# Patient Record
Sex: FEMALE | Race: WHITE | NOT HISPANIC OR LATINO
[De-identification: names, ages, dates, MRNs, and addresses within clinical notes are randomized per-mention and may not be internally consistent; named-entity substitution may affect disease eponyms.]

---

## 2017-09-08 PROBLEM — Z00.00 ENCOUNTER FOR PREVENTIVE HEALTH EXAMINATION: Status: ACTIVE | Noted: 2017-09-08

## 2017-09-13 ENCOUNTER — OTHER (OUTPATIENT)
Age: 57
End: 2017-09-13

## 2017-09-22 ENCOUNTER — APPOINTMENT (OUTPATIENT)
Dept: ORTHOPEDIC SURGERY | Facility: CLINIC | Age: 57
End: 2017-09-22
Payer: OTHER MISCELLANEOUS

## 2017-09-22 VITALS — HEIGHT: 64 IN | BODY MASS INDEX: 36.37 KG/M2 | WEIGHT: 213 LBS

## 2017-09-22 PROCEDURE — 73564 X-RAY EXAM KNEE 4 OR MORE: CPT | Mod: 50

## 2017-09-22 PROCEDURE — 99204 OFFICE O/P NEW MOD 45 MIN: CPT

## 2017-10-13 ENCOUNTER — TRANSCRIPTION ENCOUNTER (OUTPATIENT)
Age: 57
End: 2017-10-13

## 2017-12-07 ENCOUNTER — RX RENEWAL (OUTPATIENT)
Age: 57
End: 2017-12-07

## 2017-12-11 DIAGNOSIS — Z87.39 PERSONAL HISTORY OF OTHER DISEASES OF THE MUSCULOSKELETAL SYSTEM AND CONNECTIVE TISSUE: ICD-10-CM

## 2017-12-11 DIAGNOSIS — Z56.0 UNEMPLOYMENT, UNSPECIFIED: ICD-10-CM

## 2017-12-11 DIAGNOSIS — Z78.9 OTHER SPECIFIED HEALTH STATUS: ICD-10-CM

## 2017-12-11 DIAGNOSIS — Z80.9 FAMILY HISTORY OF MALIGNANT NEOPLASM, UNSPECIFIED: ICD-10-CM

## 2017-12-11 RX ORDER — IBUPROFEN 200 MG/1
CAPSULE, LIQUID FILLED ORAL
Refills: 0 | Status: ACTIVE | COMMUNITY

## 2017-12-11 SDOH — ECONOMIC STABILITY - INCOME SECURITY: UNEMPLOYMENT, UNSPECIFIED: Z56.0

## 2017-12-15 ENCOUNTER — APPOINTMENT (OUTPATIENT)
Dept: ORTHOPEDIC SURGERY | Facility: CLINIC | Age: 57
End: 2017-12-15
Payer: OTHER MISCELLANEOUS

## 2017-12-15 DIAGNOSIS — M21.161 VARUS DEFORMITY, NOT ELSEWHERE CLASSIFIED, RIGHT KNEE: ICD-10-CM

## 2017-12-15 DIAGNOSIS — M25.561 PAIN IN RIGHT KNEE: ICD-10-CM

## 2017-12-15 DIAGNOSIS — M25.562 PAIN IN RIGHT KNEE: ICD-10-CM

## 2017-12-15 DIAGNOSIS — M21.162 VARUS DEFORMITY, NOT ELSEWHERE CLASSIFIED, LEFT KNEE: ICD-10-CM

## 2017-12-15 PROCEDURE — 99214 OFFICE O/P EST MOD 30 MIN: CPT

## 2017-12-15 PROCEDURE — 73564 X-RAY EXAM KNEE 4 OR MORE: CPT | Mod: 50

## 2017-12-20 ENCOUNTER — OUTPATIENT (OUTPATIENT)
Dept: OUTPATIENT SERVICES | Facility: HOSPITAL | Age: 57
LOS: 1 days | Discharge: HOME | End: 2017-12-20
Payer: OTHER MISCELLANEOUS

## 2017-12-20 VITALS
SYSTOLIC BLOOD PRESSURE: 121 MMHG | WEIGHT: 215.83 LBS | DIASTOLIC BLOOD PRESSURE: 64 MMHG | HEART RATE: 65 BPM | RESPIRATION RATE: 16 BRPM | TEMPERATURE: 97 F | OXYGEN SATURATION: 99 % | HEIGHT: 64.5 IN

## 2017-12-20 DIAGNOSIS — M21.162 VARUS DEFORMITY, NOT ELSEWHERE CLASSIFIED, LEFT KNEE: ICD-10-CM

## 2017-12-20 DIAGNOSIS — Z01.818 ENCOUNTER FOR OTHER PREPROCEDURAL EXAMINATION: ICD-10-CM

## 2017-12-20 DIAGNOSIS — M17.0 BILATERAL PRIMARY OSTEOARTHRITIS OF KNEE: ICD-10-CM

## 2017-12-20 DIAGNOSIS — Z98.890 OTHER SPECIFIED POSTPROCEDURAL STATES: Chronic | ICD-10-CM

## 2017-12-20 DIAGNOSIS — M21.161 VARUS DEFORMITY, NOT ELSEWHERE CLASSIFIED, RIGHT KNEE: ICD-10-CM

## 2017-12-20 LAB
ANION GAP SERPL CALC-SCNC: 6 MMOL/L — SIGNIFICANT CHANGE UP (ref 5–17)
APTT BLD: 29.9 SEC — SIGNIFICANT CHANGE UP (ref 27.5–37.4)
BASOPHILS # BLD AUTO: 0 K/UL — SIGNIFICANT CHANGE UP (ref 0–0.2)
BASOPHILS NFR BLD AUTO: 0.7 % — SIGNIFICANT CHANGE UP (ref 0–2)
BUN SERPL-MCNC: 11 MG/DL — SIGNIFICANT CHANGE UP (ref 7–23)
CALCIUM SERPL-MCNC: 8.7 MG/DL — SIGNIFICANT CHANGE UP (ref 8.5–10.1)
CHLORIDE SERPL-SCNC: 106 MMOL/L — SIGNIFICANT CHANGE UP (ref 96–108)
CO2 SERPL-SCNC: 27 MMOL/L — SIGNIFICANT CHANGE UP (ref 22–31)
CREAT SERPL-MCNC: 0.72 MG/DL — SIGNIFICANT CHANGE UP (ref 0.5–1.3)
EOSINOPHIL # BLD AUTO: 0.1 K/UL — SIGNIFICANT CHANGE UP (ref 0–0.5)
EOSINOPHIL NFR BLD AUTO: 1.7 % — SIGNIFICANT CHANGE UP (ref 0–6)
GLUCOSE SERPL-MCNC: 96 MG/DL — SIGNIFICANT CHANGE UP (ref 70–99)
HBA1C BLD-MCNC: 5.7 % — HIGH (ref 4–5.6)
HCT VFR BLD CALC: 37 % — SIGNIFICANT CHANGE UP (ref 34.5–45)
HGB BLD-MCNC: 12.5 G/DL — SIGNIFICANT CHANGE UP (ref 11.5–15.5)
INR BLD: 1.07 RATIO — SIGNIFICANT CHANGE UP (ref 0.88–1.16)
LYMPHOCYTES # BLD AUTO: 1.5 K/UL — SIGNIFICANT CHANGE UP (ref 1–3.3)
LYMPHOCYTES # BLD AUTO: 24.2 % — SIGNIFICANT CHANGE UP (ref 13–44)
MCHC RBC-ENTMCNC: 28.7 PG — SIGNIFICANT CHANGE UP (ref 27–34)
MCHC RBC-ENTMCNC: 33.9 GM/DL — SIGNIFICANT CHANGE UP (ref 32–36)
MCV RBC AUTO: 84.8 FL — SIGNIFICANT CHANGE UP (ref 80–100)
MONOCYTES # BLD AUTO: 0.4 K/UL — SIGNIFICANT CHANGE UP (ref 0–0.9)
MONOCYTES NFR BLD AUTO: 6.3 % — SIGNIFICANT CHANGE UP (ref 2–14)
MRSA PCR RESULT.: SIGNIFICANT CHANGE UP
NEUTROPHILS # BLD AUTO: 4.2 K/UL — SIGNIFICANT CHANGE UP (ref 1.8–7.4)
NEUTROPHILS NFR BLD AUTO: 67.2 % — SIGNIFICANT CHANGE UP (ref 43–77)
PLATELET # BLD AUTO: 289 K/UL — SIGNIFICANT CHANGE UP (ref 150–400)
POTASSIUM SERPL-MCNC: 4.3 MMOL/L — SIGNIFICANT CHANGE UP (ref 3.5–5.3)
POTASSIUM SERPL-SCNC: 4.3 MMOL/L — SIGNIFICANT CHANGE UP (ref 3.5–5.3)
PROTHROM AB SERPL-ACNC: 11.7 SEC — SIGNIFICANT CHANGE UP (ref 9.8–12.7)
RBC # BLD: 4.36 M/UL — SIGNIFICANT CHANGE UP (ref 3.8–5.2)
RBC # FLD: 13.2 % — SIGNIFICANT CHANGE UP (ref 11–15)
S AUREUS DNA NOSE QL NAA+PROBE: SIGNIFICANT CHANGE UP
SODIUM SERPL-SCNC: 139 MMOL/L — SIGNIFICANT CHANGE UP (ref 135–145)
WBC # BLD: 6.2 K/UL — SIGNIFICANT CHANGE UP (ref 3.8–10.5)
WBC # FLD AUTO: 6.2 K/UL — SIGNIFICANT CHANGE UP (ref 3.8–10.5)

## 2017-12-20 PROCEDURE — 93010 ELECTROCARDIOGRAM REPORT: CPT | Mod: NC

## 2017-12-20 RX ORDER — FERROUS SULFATE 325(65) MG
1 TABLET ORAL
Qty: 0 | Refills: 0 | COMMUNITY

## 2017-12-20 NOTE — OCCUPATIONAL THERAPY INITIAL EVALUATION ADULT - ADDITIONAL COMMENTS
Patient lives in an apartment inside of an apartment complex which has a side entrance with no steps to enter. Once inside, the patient has an elevator that takes the patient to the main level of the apartment floor. The patients bathroom has a  high tub/shower combination with a regular toilet. The patient reports having a shower chair and no other devices in the bathroom. The patient ambulates with no device and owns multiple rolling walkers. The patient reports having 8/10 pain in both knees.

## 2017-12-20 NOTE — PHYSICAL THERAPY INITIAL EVALUATION ADULT - PERTINENT HX OF CURRENT PROBLEM, REHAB EVAL
Patient is for elective bilateral total knee arthroplasty at a later date from now due to chronic pain to knees.

## 2017-12-20 NOTE — OCCUPATIONAL THERAPY INITIAL EVALUATION ADULT - TRANSFER SAFETY CONCERNS NOTED: SIT/STAND, REHAB EVAL
decreased step length/decreased weight-shifting ability/decreased balance during turns/decreased sequencing ability

## 2017-12-20 NOTE — PHYSICAL THERAPY INITIAL EVALUATION ADULT - ADDITIONAL COMMENTS
As per patient, lives in apartment complex c elderly mother who is independent in all her functions. Patient reports did not require any mobility devices to ambulate but owns a shower chair. Has a side entrance to building which has a ramp access; front entrance with stairs. Has elevator access to apartment. Bedroom and bathroom all at same level. Bathroom has a shower and tub combo without grab rails.

## 2017-12-20 NOTE — OCCUPATIONAL THERAPY INITIAL EVALUATION ADULT - SOCIAL CONCERNS
As per patient "My brother can come by and help me with any of my daily tasks and needs when I get home".

## 2017-12-20 NOTE — PHYSICAL THERAPY INITIAL EVALUATION ADULT - PLANNED THERAPY INTERVENTIONS, PT EVAL
bed mobility training/balance training/stretching/ROM/gait training/strengthening/manual therapy techniques/transfer training/joint mobilization/neuromuscular re-education/postural re-education

## 2017-12-20 NOTE — H&P PST ADULT - HISTORY OF PRESENT ILLNESS
57F [facility] ___ here for PST for scheduled ____ 57F here for presurgical testing for scheduled bilateral total knee replacement

## 2017-12-20 NOTE — PHYSICAL THERAPY INITIAL EVALUATION ADULT - MODIFIED CLINICAL TEST OF SENSORY INTEGRATION IN BALANCE TEST
5x Sit to Stand Test = 11 seconds, indicating significant impairment c functional mobility & strength  ; 2 Minute Walk Test = 400 feet without devices or rest stops, Pain rating 3/10, measured for baseline recording

## 2017-12-20 NOTE — PATIENT PROFILE ADULT. - PSH
S/P bunionectomy  Right ( 2016 )  S/P foot surgery, left  2016  S/P laparoscopic surgery  LEFT KNEE 2005  S/P myomectomy  2013

## 2017-12-20 NOTE — PHYSICAL THERAPY INITIAL EVALUATION ADULT - CRITERIA FOR SKILLED THERAPEUTIC INTERVENTIONS
:,:,:CH/anticipated discharge recommendation/impairments found/rehab potential/anticipated equipment needs at discharge/risk reduction/prevention/therapy frequency

## 2017-12-20 NOTE — OCCUPATIONAL THERAPY INITIAL EVALUATION ADULT - MODIFIED CLINICAL TEST OF SENSORY INTEGRATION IN BALANCE TEST
Barthel Index: Feeding Score______, Bathing Score______, Grooming Score_____, Dressing Score_____, Bowel Score_____, Bladder Score______, Toilet Score_____, Transfer Score______, Mobility Score_____, Stairs Score_____, Total Score_____. Barthel Index: Feeding Score____10__, Bathing Score___5___, Grooming Score__5___, Dressing Score__10___, Bowel Score__10___, Bladder Score___10___, Toilet Score__10___, Transfer Score___15___, Mobility Score__15___, Stairs Score__10___, Total Score__100___.

## 2017-12-20 NOTE — H&P PST ADULT - NSANTHOSAYNRD_GEN_A_CORE
No. YINKA screening performed.  STOP BANG Legend: 0-2 = LOW Risk; 3-4 = INTERMEDIATE Risk; 5-8 = HIGH Risk

## 2017-12-20 NOTE — PHYSICAL THERAPY INITIAL EVALUATION ADULT - IMPAIRMENTS FOUND, PT EVAL
joint integrity and mobility/ROM/tone/integumentary integrity/aerobic capacity/endurance/gait, locomotion, and balance/muscle strength

## 2018-01-03 ENCOUNTER — FORM ENCOUNTER (OUTPATIENT)
Age: 58
End: 2018-01-03

## 2018-01-03 ENCOUNTER — INPATIENT (INPATIENT)
Facility: HOSPITAL | Age: 58
LOS: 2 days | Discharge: NURSING, REHAB OR LONG-TERM CARE | End: 2018-01-06
Attending: ORTHOPAEDIC SURGERY | Admitting: ORTHOPAEDIC SURGERY
Payer: OTHER MISCELLANEOUS

## 2018-01-03 VITALS
HEART RATE: 85 BPM | TEMPERATURE: 98 F | SYSTOLIC BLOOD PRESSURE: 159 MMHG | DIASTOLIC BLOOD PRESSURE: 84 MMHG | OXYGEN SATURATION: 95 % | RESPIRATION RATE: 18 BRPM

## 2018-01-03 DIAGNOSIS — Z98.890 OTHER SPECIFIED POSTPROCEDURAL STATES: Chronic | ICD-10-CM

## 2018-01-03 RX ORDER — ONDANSETRON 8 MG/1
8 TABLET, FILM COATED ORAL EVERY 6 HOURS
Qty: 0 | Refills: 0 | Status: DISCONTINUED | OUTPATIENT
Start: 2018-01-04 | End: 2018-01-06

## 2018-01-03 RX ORDER — HYDROMORPHONE HYDROCHLORIDE 2 MG/ML
1 INJECTION INTRAMUSCULAR; INTRAVENOUS; SUBCUTANEOUS EVERY 4 HOURS
Qty: 0 | Refills: 0 | Status: DISCONTINUED | OUTPATIENT
Start: 2018-01-04 | End: 2018-01-06

## 2018-01-03 RX ORDER — SENNA PLUS 8.6 MG/1
2 TABLET ORAL AT BEDTIME
Qty: 0 | Refills: 0 | Status: DISCONTINUED | OUTPATIENT
Start: 2018-01-04 | End: 2018-01-06

## 2018-01-03 RX ORDER — ACETAMINOPHEN 500 MG
650 TABLET ORAL EVERY 6 HOURS
Qty: 0 | Refills: 0 | Status: DISCONTINUED | OUTPATIENT
Start: 2018-01-04 | End: 2018-01-06

## 2018-01-03 RX ORDER — ACETAMINOPHEN 500 MG
650 TABLET ORAL ONCE
Qty: 0 | Refills: 0 | Status: COMPLETED | OUTPATIENT
Start: 2018-01-04 | End: 2018-01-04

## 2018-01-03 RX ORDER — OXYCODONE HYDROCHLORIDE 5 MG/1
20 TABLET ORAL ONCE
Qty: 0 | Refills: 0 | Status: DISCONTINUED | OUTPATIENT
Start: 2018-01-04 | End: 2018-01-04

## 2018-01-03 RX ORDER — MAGNESIUM HYDROXIDE 400 MG/1
30 TABLET, CHEWABLE ORAL DAILY
Qty: 0 | Refills: 0 | Status: DISCONTINUED | OUTPATIENT
Start: 2018-01-04 | End: 2018-01-06

## 2018-01-03 RX ORDER — OXYCODONE HYDROCHLORIDE 5 MG/1
10 TABLET ORAL EVERY 4 HOURS
Qty: 0 | Refills: 0 | Status: DISCONTINUED | OUTPATIENT
Start: 2018-01-04 | End: 2018-01-06

## 2018-01-03 RX ORDER — BENZOCAINE AND MENTHOL 5; 1 G/100ML; G/100ML
1 LIQUID ORAL EVERY 6 HOURS
Qty: 0 | Refills: 0 | Status: DISCONTINUED | OUTPATIENT
Start: 2018-01-04 | End: 2018-01-06

## 2018-01-03 RX ORDER — POLYETHYLENE GLYCOL 3350 17 G/17G
17 POWDER, FOR SOLUTION ORAL DAILY
Qty: 0 | Refills: 0 | Status: DISCONTINUED | OUTPATIENT
Start: 2018-01-04 | End: 2018-01-06

## 2018-01-03 RX ORDER — METOCLOPRAMIDE HCL 10 MG
10 TABLET ORAL EVERY 6 HOURS
Qty: 0 | Refills: 0 | Status: DISCONTINUED | OUTPATIENT
Start: 2018-01-04 | End: 2018-01-06

## 2018-01-03 RX ORDER — CELECOXIB 200 MG/1
200 CAPSULE ORAL ONCE
Qty: 0 | Refills: 0 | Status: COMPLETED | OUTPATIENT
Start: 2018-01-04 | End: 2018-01-04

## 2018-01-03 RX ORDER — DOCUSATE SODIUM 100 MG
100 CAPSULE ORAL THREE TIMES A DAY
Qty: 0 | Refills: 0 | Status: DISCONTINUED | OUTPATIENT
Start: 2018-01-04 | End: 2018-01-06

## 2018-01-03 RX ORDER — OXYCODONE HYDROCHLORIDE 5 MG/1
5 TABLET ORAL EVERY 4 HOURS
Qty: 0 | Refills: 0 | Status: DISCONTINUED | OUTPATIENT
Start: 2018-01-04 | End: 2018-01-06

## 2018-01-03 RX ORDER — SODIUM CHLORIDE 9 MG/ML
1000 INJECTION, SOLUTION INTRAVENOUS
Qty: 0 | Refills: 0 | Status: DISCONTINUED | OUTPATIENT
Start: 2018-01-04 | End: 2018-01-05

## 2018-01-03 RX ORDER — ENOXAPARIN SODIUM 100 MG/ML
40 INJECTION SUBCUTANEOUS EVERY 24 HOURS
Qty: 0 | Refills: 0 | Status: DISCONTINUED | OUTPATIENT
Start: 2018-01-05 | End: 2018-01-06

## 2018-01-03 NOTE — PATIENT PROFILE ADULT. - TEACHING/LEARNING LEARNING PREFERENCES
individual instruction/video/verbal instruction/pictorial/written material/skill demonstration/computer/internet/group instruction/audio

## 2018-01-04 ENCOUNTER — TRANSCRIPTION ENCOUNTER (OUTPATIENT)
Age: 58
End: 2018-01-04

## 2018-01-04 ENCOUNTER — APPOINTMENT (OUTPATIENT)
Dept: ORTHOPEDIC SURGERY | Facility: HOSPITAL | Age: 58
End: 2018-01-04

## 2018-01-04 ENCOUNTER — RESULT REVIEW (OUTPATIENT)
Age: 58
End: 2018-01-04

## 2018-01-04 LAB
ANION GAP SERPL CALC-SCNC: 12 MMOL/L — SIGNIFICANT CHANGE UP (ref 5–17)
BUN SERPL-MCNC: 7 MG/DL — SIGNIFICANT CHANGE UP (ref 7–23)
CALCIUM SERPL-MCNC: 8.2 MG/DL — LOW (ref 8.5–10.1)
CHLORIDE SERPL-SCNC: 109 MMOL/L — HIGH (ref 96–108)
CO2 SERPL-SCNC: 23 MMOL/L — SIGNIFICANT CHANGE UP (ref 22–31)
CREAT SERPL-MCNC: 0.8 MG/DL — SIGNIFICANT CHANGE UP (ref 0.5–1.3)
GLUCOSE SERPL-MCNC: 129 MG/DL — HIGH (ref 70–99)
HCT VFR BLD CALC: 35.1 % — SIGNIFICANT CHANGE UP (ref 34.5–45)
HGB BLD-MCNC: 11.8 G/DL — SIGNIFICANT CHANGE UP (ref 11.5–15.5)
INR BLD: 1.11 RATIO — SIGNIFICANT CHANGE UP (ref 0.88–1.16)
MCHC RBC-ENTMCNC: 28.8 PG — SIGNIFICANT CHANGE UP (ref 27–34)
MCHC RBC-ENTMCNC: 33.6 GM/DL — SIGNIFICANT CHANGE UP (ref 32–36)
MCV RBC AUTO: 85.6 FL — SIGNIFICANT CHANGE UP (ref 80–100)
PLATELET # BLD AUTO: 291 K/UL — SIGNIFICANT CHANGE UP (ref 150–400)
POTASSIUM SERPL-MCNC: 3.7 MMOL/L — SIGNIFICANT CHANGE UP (ref 3.5–5.3)
POTASSIUM SERPL-SCNC: 3.7 MMOL/L — SIGNIFICANT CHANGE UP (ref 3.5–5.3)
PROTHROM AB SERPL-ACNC: 12.1 SEC — SIGNIFICANT CHANGE UP (ref 9.8–12.7)
RBC # BLD: 4.1 M/UL — SIGNIFICANT CHANGE UP (ref 3.8–5.2)
RBC # FLD: 13.5 % — SIGNIFICANT CHANGE UP (ref 11–15)
SODIUM SERPL-SCNC: 144 MMOL/L — SIGNIFICANT CHANGE UP (ref 135–145)
WBC # BLD: 9.5 K/UL — SIGNIFICANT CHANGE UP (ref 3.8–10.5)
WBC # FLD AUTO: 9.5 K/UL — SIGNIFICANT CHANGE UP (ref 3.8–10.5)

## 2018-01-04 PROCEDURE — 27447 TOTAL KNEE ARTHROPLASTY: CPT | Mod: 50

## 2018-01-04 PROCEDURE — 88311 DECALCIFY TISSUE: CPT | Mod: 26

## 2018-01-04 PROCEDURE — 88305 TISSUE EXAM BY PATHOLOGIST: CPT | Mod: 26

## 2018-01-04 RX ORDER — SODIUM CHLORIDE 9 MG/ML
1000 INJECTION INTRAMUSCULAR; INTRAVENOUS; SUBCUTANEOUS
Qty: 0 | Refills: 0 | Status: DISCONTINUED | OUTPATIENT
Start: 2018-01-04 | End: 2018-01-04

## 2018-01-04 RX ORDER — SODIUM CHLORIDE 9 MG/ML
1000 INJECTION, SOLUTION INTRAVENOUS
Qty: 0 | Refills: 0 | Status: DISCONTINUED | OUTPATIENT
Start: 2018-01-04 | End: 2018-01-04

## 2018-01-04 RX ORDER — OXYCODONE HYDROCHLORIDE 5 MG/1
20 TABLET ORAL ONCE
Qty: 0 | Refills: 0 | Status: DISCONTINUED | OUTPATIENT
Start: 2018-01-04 | End: 2018-01-04

## 2018-01-04 RX ORDER — VANCOMYCIN HCL 1 G
1000 VIAL (EA) INTRAVENOUS EVERY 12 HOURS
Qty: 0 | Refills: 0 | Status: COMPLETED | OUTPATIENT
Start: 2018-01-04 | End: 2018-01-05

## 2018-01-04 RX ORDER — ACETAMINOPHEN 500 MG
1000 TABLET ORAL ONCE
Qty: 0 | Refills: 0 | Status: COMPLETED | OUTPATIENT
Start: 2018-01-04 | End: 2018-01-04

## 2018-01-04 RX ORDER — HYDROMORPHONE HYDROCHLORIDE 2 MG/ML
1 INJECTION INTRAMUSCULAR; INTRAVENOUS; SUBCUTANEOUS ONCE
Qty: 0 | Refills: 0 | Status: DISCONTINUED | OUTPATIENT
Start: 2018-01-04 | End: 2018-01-04

## 2018-01-04 RX ORDER — ACETAMINOPHEN 500 MG
650 TABLET ORAL ONCE
Qty: 0 | Refills: 0 | Status: DISCONTINUED | OUTPATIENT
Start: 2018-01-04 | End: 2018-01-04

## 2018-01-04 RX ORDER — CELECOXIB 200 MG/1
200 CAPSULE ORAL ONCE
Qty: 0 | Refills: 0 | Status: DISCONTINUED | OUTPATIENT
Start: 2018-01-04 | End: 2018-01-04

## 2018-01-04 RX ADMIN — HYDROMORPHONE HYDROCHLORIDE 1 MILLIGRAM(S): 2 INJECTION INTRAMUSCULAR; INTRAVENOUS; SUBCUTANEOUS at 21:48

## 2018-01-04 RX ADMIN — Medication 650 MILLIGRAM(S): at 07:17

## 2018-01-04 RX ADMIN — Medication 400 MILLIGRAM(S): at 23:23

## 2018-01-04 RX ADMIN — SODIUM CHLORIDE 75 MILLILITER(S): 9 INJECTION, SOLUTION INTRAVENOUS at 23:27

## 2018-01-04 RX ADMIN — SODIUM CHLORIDE 75 MILLILITER(S): 9 INJECTION INTRAMUSCULAR; INTRAVENOUS; SUBCUTANEOUS at 10:41

## 2018-01-04 RX ADMIN — CELECOXIB 200 MILLIGRAM(S): 200 CAPSULE ORAL at 11:54

## 2018-01-04 RX ADMIN — HYDROMORPHONE HYDROCHLORIDE 1 MILLIGRAM(S): 2 INJECTION INTRAMUSCULAR; INTRAVENOUS; SUBCUTANEOUS at 21:11

## 2018-01-04 RX ADMIN — SODIUM CHLORIDE 125 MILLILITER(S): 9 INJECTION, SOLUTION INTRAVENOUS at 18:16

## 2018-01-04 RX ADMIN — Medication 650 MILLIGRAM(S): at 07:18

## 2018-01-04 RX ADMIN — Medication 10 MILLIGRAM(S): at 23:26

## 2018-01-04 RX ADMIN — Medication 1000 MILLIGRAM(S): at 23:50

## 2018-01-04 RX ADMIN — OXYCODONE HYDROCHLORIDE 20 MILLIGRAM(S): 5 TABLET ORAL at 11:55

## 2018-01-04 RX ADMIN — CELECOXIB 200 MILLIGRAM(S): 200 CAPSULE ORAL at 12:30

## 2018-01-04 RX ADMIN — HYDROMORPHONE HYDROCHLORIDE 1 MILLIGRAM(S): 2 INJECTION INTRAMUSCULAR; INTRAVENOUS; SUBCUTANEOUS at 18:16

## 2018-01-04 RX ADMIN — OXYCODONE HYDROCHLORIDE 20 MILLIGRAM(S): 5 TABLET ORAL at 12:20

## 2018-01-04 RX ADMIN — HYDROMORPHONE HYDROCHLORIDE 1 MILLIGRAM(S): 2 INJECTION INTRAMUSCULAR; INTRAVENOUS; SUBCUTANEOUS at 18:26

## 2018-01-04 NOTE — DISCHARGE NOTE ADULT - NS AS ACTIVITY OBS
No Heavy lifting/straining/Walking-Indoors allowed/Do not make important decisions/Do not drive or operate machinery/Walking-Outdoors allowed

## 2018-01-04 NOTE — PHYSICAL THERAPY INITIAL EVALUATION ADULT - PERSONAL SAFETY AND JUDGMENT, REHAB EVAL
pt in severe pain & sitting/thrashing backwards in bed during functional mobility assessment/impaired

## 2018-01-04 NOTE — PROGRESS NOTE ADULT - SUBJECTIVE AND OBJECTIVE BOX
Patient seen and examined. Pain controlled.    Physical exam  VS: Afebrile, vital signs stable  Gen: NAD  LLE: Dressing clean, dry, and intact. +HMV.  +EHL/FHL/TA/GSC. SILT L3-S1. +Dorsalis pedis, capillary refill brisk. Compartments soft and nontender.  RLE: Dressing clean, dry, and intact. +HMV.  +EHL/FHL/TA/GSC. SILT L3-S1. +Dorsalis pedis pulse, capillary refill brisk. Compartments soft and nontender.    57F s/p B/L TKA POD# 0    WBAT  Pain control  drain output  DVT prophylaxis  PT  Discharge planning

## 2018-01-04 NOTE — DISCHARGE NOTE ADULT - CARE PROVIDER_API CALL
Tristan Sweet (MD), Orthopaedic Surgery  [address]  [address] [place] [address]  Phone: [phone]  Fax: [phone]

## 2018-01-04 NOTE — DISCHARGE NOTE ADULT - MEDICATION SUMMARY - MEDICATIONS TO TAKE
I will START or STAY ON the medications listed below when I get home from the hospital:    Ecotrin 325 mg oral delayed release tablet  -- 1 tab(s) by mouth 2 times a day starting post-operative day 15 and continuing for 30 days  -- Swallow whole.  Do not crush.  Take with food or milk.    -- Indication: For dvt ppx    oxyCODONE-acetaminophen 5 mg-325 mg oral tablet  -- 1 tab(s) by mouth every 4 hours MDD:6  -- Caution federal law prohibits the transfer of this drug to any person other  than the person for whom it was prescribed.  May cause drowsiness.  Alcohol may intensify this effect.  Use care when operating dangerous machinery.  This prescription cannot be refilled.  This product contains acetaminophen.  Do not use  with any other product containing acetaminophen to prevent possible liver damage.  Using more of this medication than prescribed may cause serious breathing problems.    -- Indication: For prn pain    Lovenox 30 mg/0.3 mL injectable solution  -- 30 milligram(s) subcutaneously 2 times a day   -- It is very important that you take or use this exactly as directed.  Do not skip doses or discontinue unless directed by your doctor.    -- Indication: For dvt ppx    ferrous sulfate 325 mg (65 mg elemental iron) oral tablet  -- 1 tab(s) by mouth 3 times a day  -- Indication: For per pmd I will START or STAY ON the medications listed below when I get home from the hospital:    Ecotrin 325 mg oral delayed release tablet  -- 1 tab(s) by mouth 2 times a day starting post-operative day 15 and continuing for 30 days  -- Swallow whole.  Do not crush.  Take with food or milk.    -- Indication: For dvt ppx    oxyCODONE-acetaminophen 5 mg-325 mg oral tablet  -- 1 tab(s) by mouth every 4 hours MDD:6  -- Caution federal law prohibits the transfer of this drug to any person other  than the person for whom it was prescribed.  May cause drowsiness.  Alcohol may intensify this effect.  Use care when operating dangerous machinery.  This prescription cannot be refilled.  This product contains acetaminophen.  Do not use  with any other product containing acetaminophen to prevent possible liver damage.  Using more of this medication than prescribed may cause serious breathing problems.    -- Indication: For prn pain    Lovenox 40 mg/0.4 mL injectable solution  -- 40 milligram(s) subcutaneously once a day   -- It is very important that you take or use this exactly as directed.  Do not skip doses or discontinue unless directed by your doctor.    -- Indication: For dvt ppx    ferrous sulfate 325 mg (65 mg elemental iron) oral tablet  -- 1 tab(s) by mouth 3 times a day  -- Indication: For per pmd

## 2018-01-04 NOTE — DISCHARGE NOTE ADULT - CARE PLAN
Principal Discharge DX:	Total knee replacement status, bilateral  Goal:	return to ADLs  Instructions for follow-up, activity and diet:	1.	Pain Control  2.	Walking with full weight bearing as tolerated, with assistive devices (walker/Cane as Needed)  3.	DVT Prophylaxis  4.	PT as needed  5.	Follow up with Dr. Sweet as Outpatient in 10-14 Days after Discharge from the Hospital or Rehab. Call Office For Appointment. [phone]  6.	Remove Staples Post-Op Day 21, and Remove Dressing Post-Op Day 10, with Daily Dressing Changes as Need.  7.	Ice/Elevate affected area as Needed  8.	Keep Dressing Clean and dry. Principal Discharge DX:	Total knee replacement status, bilateral  Goal:	return to ADLs  Instructions for follow-up, activity and diet:	1.	Pain Control  2.	Walking with full weight bearing as tolerated, with assistive devices (walker/Cane as Needed)  3.	DVT Prophylaxis, Lovenox 40mg subcutaneous daily for 14 days. Day 15 start Aspirin 325 mg twice a day for 30 days. do not skip doses.  4.	PT as needed  5.	Follow up with Dr. Sweet as Outpatient in 10-14 Days after Discharge from the Hospital or Rehab. Call Office For Appointment. [phone]  6.	Remove Staples Post-Op Day 21, and Remove Dressing Post-Op Day 10, with Daily Dressing Changes as Need.  7.	Ice/Elevate affected area as Needed  8.	Keep Dressing/Splint/Cast Clean and dry.

## 2018-01-04 NOTE — DISCHARGE NOTE ADULT - PATIENT PORTAL LINK FT
“You can access the [facility] Patient Portal, offered by [facility], by registering with the following website: [email or web address]”

## 2018-01-04 NOTE — PHYSICAL THERAPY INITIAL EVALUATION ADULT - GENERAL OBSERVATIONS, REHAB EVAL
Pt encountered sitting in bed + IV LUE, cardiac monitor, continuous pulse ox, ace wrap & hemovac x 2 (B/L knees, clean, dry, & intact), pneumatic teds donned B/L

## 2018-01-04 NOTE — DISCHARGE NOTE ADULT - HOSPITAL COURSE
The patient is a 57 year old female status post elective total knee Arthroplasty to bilateral knees after failing outpatient nonoperative conservative management.  Patient presented to [facility] after being medically cleared for an elective surgical procedure. The patient was taken to the operating room on date mentioned above. Prophylactic antibiotics were started before the procedure and continued for 24 hours.  There were no complications during the procedure and patient tolerated the procedure well.  The patient was transferred to recovery room in stable condition and subsequently to surgical floor.  Patient was placed on Lovenox  for anticoagulation.  All home medications were continued.  The patient received physical therapy daily and daily labs were followed.  The dressing was kept clean, dry, intact and changed on POD 3.  The rest of the hospital stay was unremarkable. The patient is a 57 year old female status post elective total knee Arthroplasty to bilateral knees after failing outpatient nonoperative conservative management.  Patient presented to [facility] after being medically cleared for an elective surgical procedure. The patient was taken to the operating room on date mentioned above. Prophylactic antibiotics were started before the procedure and continued for 24 hours.  There were no complications during the procedure and patient tolerated the procedure well.  The patient was transferred to recovery room in stable condition and subsequently to surgical floor.  Patient was placed on Lovenox  for anticoagulation.  All home medications were continued.  The patient received physical therapy daily and daily labs were followed.  The dressing was kept clean, dry, intact and changed on POD 2.  The rest of the hospital stay was unremarkable.

## 2018-01-04 NOTE — DISCHARGE NOTE ADULT - PLAN OF CARE
return to ADLs 1.	Pain Control  2.	Walking with full weight bearing as tolerated, with assistive devices (walker/Cane as Needed)  3.	DVT Prophylaxis  4.	PT as needed  5.	Follow up with Dr. Sweet as Outpatient in 10-14 Days after Discharge from the Hospital or Rehab. Call Office For Appointment. [phone]  6.	Remove Staples Post-Op Day 21, and Remove Dressing Post-Op Day 10, with Daily Dressing Changes as Need.  7.	Ice/Elevate affected area as Needed  8.	Keep Dressing Clean and dry. 1.	Pain Control  2.	Walking with full weight bearing as tolerated, with assistive devices (walker/Cane as Needed)  3.	DVT Prophylaxis, Lovenox 40mg subcutaneous daily for 14 days. Day 15 start Aspirin 325 mg twice a day for 30 days. do not skip doses.  4.	PT as needed  5.	Follow up with Dr. Sweet as Outpatient in 10-14 Days after Discharge from the Hospital or Rehab. Call Office For Appointment. [phone]  6.	Remove Staples Post-Op Day 21, and Remove Dressing Post-Op Day 10, with Daily Dressing Changes as Need.  7.	Ice/Elevate affected area as Needed  8.	Keep Dressing/Splint/Cast Clean and dry.

## 2018-01-04 NOTE — BRIEF OPERATIVE NOTE - PROCEDURE
<<-----Click on this checkbox to enter Procedure Bilateral total knee arthroplasty  01/04/2018    Active  [id number]

## 2018-01-04 NOTE — PHYSICAL THERAPY INITIAL EVALUATION ADULT - PERTINENT HX OF CURRENT PROBLEM, REHAB EVAL
Pt is a 56yo F admitted for elective B/L TKA due to B/L knee OA. Pt s/p B/L TKA on 1/4. Post-op course with + pain & nausea.

## 2018-01-04 NOTE — PHYSICAL THERAPY INITIAL EVALUATION ADULT - PLANNED THERAPY INTERVENTIONS, PT EVAL
transfer training/gait training/balance training/bed mobility training/manual therapy techniques/ROM

## 2018-01-04 NOTE — PHYSICAL THERAPY INITIAL EVALUATION ADULT - ADDITIONAL COMMENTS
Pt lives with her elderly mother in an apartment complex, + ramp access, elevator access inside. Prior to admission pt was independent with all functional mobility including community ambulation without a device. Pt owns shower chair. Goal of therapy: manage pain & improve functional mobility.

## 2018-01-04 NOTE — PHYSICAL THERAPY INITIAL EVALUATION ADULT - MODIFIED CLINICAL TEST OF SENSORY INTEGRATION IN BALANCE TEST
Barthel Index: Feeding Score _10/10__, Bathing Score _0/5__, Grooming Score _0/5__, Dressing Score _5/10__, Bowels Score __10/10_, Bladder Score _10/10__, Toilet Score _5/10__, Transfers Score _0/15__, Mobility Score __0/15_, Stairs Score _0/10__,     Total Score ___40/100

## 2018-01-05 LAB
ANION GAP SERPL CALC-SCNC: 9 MMOL/L — SIGNIFICANT CHANGE UP (ref 5–17)
BUN SERPL-MCNC: 12 MG/DL — SIGNIFICANT CHANGE UP (ref 7–23)
CALCIUM SERPL-MCNC: 8.5 MG/DL — SIGNIFICANT CHANGE UP (ref 8.5–10.1)
CHLORIDE SERPL-SCNC: 105 MMOL/L — SIGNIFICANT CHANGE UP (ref 96–108)
CO2 SERPL-SCNC: 26 MMOL/L — SIGNIFICANT CHANGE UP (ref 22–31)
CREAT SERPL-MCNC: 0.93 MG/DL — SIGNIFICANT CHANGE UP (ref 0.5–1.3)
GLUCOSE SERPL-MCNC: 123 MG/DL — HIGH (ref 70–99)
HCT VFR BLD CALC: 32.1 % — LOW (ref 34.5–45)
HGB BLD-MCNC: 10.7 G/DL — LOW (ref 11.5–15.5)
MCHC RBC-ENTMCNC: 28.3 PG — SIGNIFICANT CHANGE UP (ref 27–34)
MCHC RBC-ENTMCNC: 33.2 GM/DL — SIGNIFICANT CHANGE UP (ref 32–36)
MCV RBC AUTO: 85.1 FL — SIGNIFICANT CHANGE UP (ref 80–100)
PLATELET # BLD AUTO: 280 K/UL — SIGNIFICANT CHANGE UP (ref 150–400)
POTASSIUM SERPL-MCNC: 4.4 MMOL/L — SIGNIFICANT CHANGE UP (ref 3.5–5.3)
POTASSIUM SERPL-SCNC: 4.4 MMOL/L — SIGNIFICANT CHANGE UP (ref 3.5–5.3)
RBC # BLD: 3.77 M/UL — LOW (ref 3.8–5.2)
RBC # FLD: 13.7 % — SIGNIFICANT CHANGE UP (ref 11–15)
SODIUM SERPL-SCNC: 140 MMOL/L — SIGNIFICANT CHANGE UP (ref 135–145)
WBC # BLD: 12.3 K/UL — HIGH (ref 3.8–10.5)
WBC # FLD AUTO: 12.3 K/UL — HIGH (ref 3.8–10.5)

## 2018-01-05 PROCEDURE — 73560 X-RAY EXAM OF KNEE 1 OR 2: CPT | Mod: 26,50

## 2018-01-05 RX ORDER — ASPIRIN/CALCIUM CARB/MAGNESIUM 324 MG
1 TABLET ORAL
Qty: 60 | Refills: 0 | OUTPATIENT
Start: 2018-01-05 | End: 2018-02-03

## 2018-01-05 RX ORDER — ENOXAPARIN SODIUM 100 MG/ML
40 INJECTION SUBCUTANEOUS
Qty: 560 | Refills: 0 | OUTPATIENT
Start: 2018-01-05 | End: 2018-01-18

## 2018-01-05 RX ORDER — ENOXAPARIN SODIUM 100 MG/ML
30 INJECTION SUBCUTANEOUS
Qty: 840 | Refills: 0 | OUTPATIENT
Start: 2018-01-05 | End: 2018-01-18

## 2018-01-05 RX ORDER — ACETAMINOPHEN 500 MG
1000 TABLET ORAL ONCE
Qty: 0 | Refills: 0 | Status: COMPLETED | OUTPATIENT
Start: 2018-01-05 | End: 2018-01-05

## 2018-01-05 RX ADMIN — Medication 1000 MILLIGRAM(S): at 12:20

## 2018-01-05 RX ADMIN — HYDROMORPHONE HYDROCHLORIDE 1 MILLIGRAM(S): 2 INJECTION INTRAMUSCULAR; INTRAVENOUS; SUBCUTANEOUS at 01:42

## 2018-01-05 RX ADMIN — HYDROMORPHONE HYDROCHLORIDE 1 MILLIGRAM(S): 2 INJECTION INTRAMUSCULAR; INTRAVENOUS; SUBCUTANEOUS at 15:58

## 2018-01-05 RX ADMIN — ONDANSETRON 8 MILLIGRAM(S): 8 TABLET, FILM COATED ORAL at 15:58

## 2018-01-05 RX ADMIN — HYDROMORPHONE HYDROCHLORIDE 1 MILLIGRAM(S): 2 INJECTION INTRAMUSCULAR; INTRAVENOUS; SUBCUTANEOUS at 19:59

## 2018-01-05 RX ADMIN — HYDROMORPHONE HYDROCHLORIDE 1 MILLIGRAM(S): 2 INJECTION INTRAMUSCULAR; INTRAVENOUS; SUBCUTANEOUS at 07:54

## 2018-01-05 RX ADMIN — HYDROMORPHONE HYDROCHLORIDE 1 MILLIGRAM(S): 2 INJECTION INTRAMUSCULAR; INTRAVENOUS; SUBCUTANEOUS at 19:44

## 2018-01-05 RX ADMIN — OXYCODONE HYDROCHLORIDE 10 MILLIGRAM(S): 5 TABLET ORAL at 21:42

## 2018-01-05 RX ADMIN — Medication 650 MILLIGRAM(S): at 23:54

## 2018-01-05 RX ADMIN — HYDROMORPHONE HYDROCHLORIDE 1 MILLIGRAM(S): 2 INJECTION INTRAMUSCULAR; INTRAVENOUS; SUBCUTANEOUS at 16:15

## 2018-01-05 RX ADMIN — OXYCODONE HYDROCHLORIDE 10 MILLIGRAM(S): 5 TABLET ORAL at 22:42

## 2018-01-05 RX ADMIN — Medication 250 MILLIGRAM(S): at 01:28

## 2018-01-05 RX ADMIN — Medication 400 MILLIGRAM(S): at 11:57

## 2018-01-05 RX ADMIN — ENOXAPARIN SODIUM 40 MILLIGRAM(S): 100 INJECTION SUBCUTANEOUS at 07:57

## 2018-01-05 RX ADMIN — HYDROMORPHONE HYDROCHLORIDE 1 MILLIGRAM(S): 2 INJECTION INTRAMUSCULAR; INTRAVENOUS; SUBCUTANEOUS at 01:33

## 2018-01-05 RX ADMIN — ONDANSETRON 8 MILLIGRAM(S): 8 TABLET, FILM COATED ORAL at 07:54

## 2018-01-05 RX ADMIN — ONDANSETRON 8 MILLIGRAM(S): 8 TABLET, FILM COATED ORAL at 01:33

## 2018-01-05 RX ADMIN — ONDANSETRON 8 MILLIGRAM(S): 8 TABLET, FILM COATED ORAL at 21:44

## 2018-01-05 RX ADMIN — HYDROMORPHONE HYDROCHLORIDE 1 MILLIGRAM(S): 2 INJECTION INTRAMUSCULAR; INTRAVENOUS; SUBCUTANEOUS at 08:15

## 2018-01-05 RX ADMIN — SODIUM CHLORIDE 75 MILLILITER(S): 9 INJECTION, SOLUTION INTRAVENOUS at 06:32

## 2018-01-05 NOTE — PROGRESS NOTE ADULT - SUBJECTIVE AND OBJECTIVE BOX
Patient seen and examined. Pain controlled.    Physical exam  VS: Afebrile, vital signs stable  Gen: NAD  LLE: Dressing clean, dry, and intact. +HMV.  +EHL/FHL/TA/GSC. SILT L3-S1. +Dorsalis pedis, capillary refill brisk. Compartments soft and nontender.  RLE: Dressing clean, dry, and intact. +HMV.  +EHL/FHL/TA/GSC. SILT L3-S1. +Dorsalis pedis pulse, capillary refill brisk. Compartments soft and nontender.    57F s/p B/L TKA POD# 1    WBAT  Pain control  drain output  DVT prophylaxis  PT  Discharge planning

## 2018-01-05 NOTE — OCCUPATIONAL THERAPY INITIAL EVALUATION ADULT - MODIFIED CLINICAL TEST OF SENSORY INTEGRATION IN BALANCE TEST
Barthel Index: Feeding Score___10___, Bathing Score___0___, Grooming Score___5__, Dressing Score___5__, Bowel Score___10__, Bladder Score___10___, Toilet Score__0___, Transfer Score___5___, Mobility Score__0___, Stairs Score___0__, Total Score__45___.

## 2018-01-05 NOTE — OCCUPATIONAL THERAPY INITIAL EVALUATION ADULT - RANGE OF MOTION EXAMINATION, LOWER EXTREMITY
bilateral LE Passive ROM was WFL  (within functional limits)/RLE AROM hip flexion WFL, RLE AROM knee flexion 0-75, RLE AROM distally to knee WFL, LLE AROM hip flexion WFL, LLE AROM knee flexion 0-60, LLE AROM distally to knee WFL

## 2018-01-05 NOTE — OCCUPATIONAL THERAPY INITIAL EVALUATION ADULT - TRANSFER SAFETY CONCERNS NOTED: SIT/STAND, REHAB EVAL
decreased sequencing ability/decreased step length/inability to maintain weight-bearing restrictions w/o assist/decreased balance during turns/decreased weight-shifting ability

## 2018-01-05 NOTE — OCCUPATIONAL THERAPY INITIAL EVALUATION ADULT - ADDITIONAL COMMENTS
Patient lives in an apartment inside of an apartment complex which has a side entrance with no steps to enter. Once inside, the patient has an elevator that takes the patient to the main level of the apartment floor. The patients bathroom has a  high tub/shower combination with a regular toilet. The patient reports having a shower chair and no other devices in the bathroom. The patient ambulates with no device and owns multiple rolling walkers.

## 2018-01-05 NOTE — OCCUPATIONAL THERAPY INITIAL EVALUATION ADULT - TRANSFER SAFETY CONCERNS NOTED: BED/CHAIR, REHAB EVAL
decreased balance during turns/decreased sequencing ability/inability to maintain weight-bearing restrictions w/o assist/decreased weight-shifting ability/decreased step length

## 2018-01-05 NOTE — OCCUPATIONAL THERAPY INITIAL EVALUATION ADULT - GENERAL OBSERVATIONS, REHAB EVAL
Pt was encountered supine in bed; NAD, S/P bilateral TKR POD 1, Bilateral LE WBAT, Bilateral knee dressing ace wrapped clean, dry and intact, penumatic pumps on, IV, cardiac monitor on, AXOX4, cooperative, followed commands; pt c/o pain in bilateral LE knees due to s/p bilateral TKR which limits pt performance with functional ADL's/transfers and mobility.

## 2018-01-06 ENCOUNTER — INPATIENT (INPATIENT)
Facility: HOSPITAL | Age: 58
LOS: 13 days | Discharge: HOME | DRG: 950 | End: 2018-01-20
Attending: PHYSICAL MEDICINE & REHABILITATION | Admitting: PHYSICAL MEDICINE & REHABILITATION
Payer: COMMERCIAL

## 2018-01-06 VITALS
DIASTOLIC BLOOD PRESSURE: 81 MMHG | RESPIRATION RATE: 15 BRPM | TEMPERATURE: 100 F | OXYGEN SATURATION: 96 % | SYSTOLIC BLOOD PRESSURE: 149 MMHG | HEART RATE: 85 BPM

## 2018-01-06 DIAGNOSIS — R26.9 UNSPECIFIED ABNORMALITIES OF GAIT AND MOBILITY: ICD-10-CM

## 2018-01-06 DIAGNOSIS — D64.9 ANEMIA, UNSPECIFIED: ICD-10-CM

## 2018-01-06 DIAGNOSIS — Z96.653 PRESENCE OF ARTIFICIAL KNEE JOINT, BILATERAL: ICD-10-CM

## 2018-01-06 DIAGNOSIS — Z47.1 AFTERCARE FOLLOWING JOINT REPLACEMENT SURGERY: ICD-10-CM

## 2018-01-06 DIAGNOSIS — K59.00 CONSTIPATION, UNSPECIFIED: ICD-10-CM

## 2018-01-06 DIAGNOSIS — Z51.89 ENCOUNTER FOR OTHER SPECIFIED AFTERCARE: ICD-10-CM

## 2018-01-06 DIAGNOSIS — Z98.890 OTHER SPECIFIED POSTPROCEDURAL STATES: Chronic | ICD-10-CM

## 2018-01-06 DIAGNOSIS — D72.829 ELEVATED WHITE BLOOD CELL COUNT, UNSPECIFIED: ICD-10-CM

## 2018-01-06 DIAGNOSIS — K13.70 UNSPECIFIED LESIONS OF ORAL MUCOSA: ICD-10-CM

## 2018-01-06 LAB
ANION GAP SERPL CALC-SCNC: 6 MMOL/L — SIGNIFICANT CHANGE UP (ref 5–17)
BUN SERPL-MCNC: 9 MG/DL — SIGNIFICANT CHANGE UP (ref 7–23)
CALCIUM SERPL-MCNC: 8.5 MG/DL — SIGNIFICANT CHANGE UP (ref 8.5–10.1)
CHLORIDE SERPL-SCNC: 101 MMOL/L — SIGNIFICANT CHANGE UP (ref 96–108)
CO2 SERPL-SCNC: 29 MMOL/L — SIGNIFICANT CHANGE UP (ref 22–31)
CREAT SERPL-MCNC: 0.82 MG/DL — SIGNIFICANT CHANGE UP (ref 0.5–1.3)
GLUCOSE SERPL-MCNC: 114 MG/DL — HIGH (ref 70–99)
HCT VFR BLD CALC: 30.6 % — LOW (ref 34.5–45)
HGB BLD-MCNC: 10.3 G/DL — LOW (ref 11.5–15.5)
MCHC RBC-ENTMCNC: 28.8 PG — SIGNIFICANT CHANGE UP (ref 27–34)
MCHC RBC-ENTMCNC: 33.8 GM/DL — SIGNIFICANT CHANGE UP (ref 32–36)
MCV RBC AUTO: 85 FL — SIGNIFICANT CHANGE UP (ref 80–100)
PLATELET # BLD AUTO: 257 K/UL — SIGNIFICANT CHANGE UP (ref 150–400)
POTASSIUM SERPL-MCNC: 4.1 MMOL/L — SIGNIFICANT CHANGE UP (ref 3.5–5.3)
POTASSIUM SERPL-SCNC: 4.1 MMOL/L — SIGNIFICANT CHANGE UP (ref 3.5–5.3)
RBC # BLD: 3.6 M/UL — LOW (ref 3.8–5.2)
RBC # FLD: 13.2 % — SIGNIFICANT CHANGE UP (ref 11–15)
SODIUM SERPL-SCNC: 136 MMOL/L — SIGNIFICANT CHANGE UP (ref 135–145)
WBC # BLD: 15.1 K/UL — HIGH (ref 3.8–10.5)
WBC # FLD AUTO: 15.1 K/UL — HIGH (ref 3.8–10.5)

## 2018-01-06 RX ADMIN — HYDROMORPHONE HYDROCHLORIDE 1 MILLIGRAM(S): 2 INJECTION INTRAMUSCULAR; INTRAVENOUS; SUBCUTANEOUS at 00:15

## 2018-01-06 RX ADMIN — ENOXAPARIN SODIUM 40 MILLIGRAM(S): 100 INJECTION SUBCUTANEOUS at 08:22

## 2018-01-06 RX ADMIN — HYDROMORPHONE HYDROCHLORIDE 1 MILLIGRAM(S): 2 INJECTION INTRAMUSCULAR; INTRAVENOUS; SUBCUTANEOUS at 10:54

## 2018-01-06 RX ADMIN — HYDROMORPHONE HYDROCHLORIDE 1 MILLIGRAM(S): 2 INJECTION INTRAMUSCULAR; INTRAVENOUS; SUBCUTANEOUS at 11:15

## 2018-01-06 RX ADMIN — ONDANSETRON 8 MILLIGRAM(S): 8 TABLET, FILM COATED ORAL at 10:57

## 2018-01-06 RX ADMIN — HYDROMORPHONE HYDROCHLORIDE 1 MILLIGRAM(S): 2 INJECTION INTRAMUSCULAR; INTRAVENOUS; SUBCUTANEOUS at 00:00

## 2018-01-06 RX ADMIN — HYDROMORPHONE HYDROCHLORIDE 1 MILLIGRAM(S): 2 INJECTION INTRAMUSCULAR; INTRAVENOUS; SUBCUTANEOUS at 04:28

## 2018-01-06 RX ADMIN — Medication 650 MILLIGRAM(S): at 11:10

## 2018-01-06 RX ADMIN — OXYCODONE HYDROCHLORIDE 10 MILLIGRAM(S): 5 TABLET ORAL at 07:09

## 2018-01-06 RX ADMIN — HYDROMORPHONE HYDROCHLORIDE 1 MILLIGRAM(S): 2 INJECTION INTRAMUSCULAR; INTRAVENOUS; SUBCUTANEOUS at 04:43

## 2018-01-06 RX ADMIN — OXYCODONE HYDROCHLORIDE 10 MILLIGRAM(S): 5 TABLET ORAL at 08:00

## 2018-01-06 NOTE — PROGRESS NOTE ADULT - SUBJECTIVE AND OBJECTIVE BOX
Patient seen and examined. Pain controlled.    Physical exam    Vital Signs Last 24 Hrs  T(C): 37.4 (06 Jan 2018 05:31), Max: 38.2 (05 Jan 2018 23:25)  T(F): 99.3 (06 Jan 2018 05:31), Max: 100.7 (05 Jan 2018 23:25)  HR: 83 (06 Jan 2018 05:31) (73 - 83)  BP: 148/77 (06 Jan 2018 05:31) (127/65 - 150/79)  BP(mean): --  RR: 16 (06 Jan 2018 05:31) (16 - 16)  SpO2: 97% (06 Jan 2018 05:31) (95% - 98%)    Gen: NAD  LLE: Dressing clean, dry, and intact. +HMV.  +EHL/FHL/TA/GSC. SILT L3-S1. +Dorsalis pedis, capillary refill brisk. Compartments soft and nontender.  RLE: Dressing clean, dry, and intact. +HMV.  +EHL/FHL/TA/GSC. SILT L3-S1. +Dorsalis pedis pulse, capillary refill brisk. Compartments soft and nontender.    57F s/p B/L TKA POD# 2    WBAT  Pain control  drain output  DVT prophylaxis  PT  Discharge planning- marlene TODAY Patient seen and examined. Pain controlled.    Physical exam    Vital Signs Last 24 Hrs  T(C): 37.4 (06 Jan 2018 05:31), Max: 38.2 (05 Jan 2018 23:25)  T(F): 99.3 (06 Jan 2018 05:31), Max: 100.7 (05 Jan 2018 23:25)  HR: 83 (06 Jan 2018 05:31) (73 - 83)  BP: 148/77 (06 Jan 2018 05:31) (127/65 - 150/79)  BP(mean): --  RR: 16 (06 Jan 2018 05:31) (16 - 16)  SpO2: 97% (06 Jan 2018 05:31) (95% - 98%)                          10.3   15.1  )-----------( 257      ( 06 Jan 2018 07:04 )             30.6   06 Jan 2018 07:04    136    |  101    |  9      ----------------------------<  114    4.1     |  29     |  0.82     Ca    8.5        06 Jan 2018 07:04        Gen: NAD  LLE: Dressing clean, dry, and intact. +HMV.  +EHL/FHL/TA/GSC. SILT L3-S1. +Dorsalis pedis, capillary refill brisk. Compartments soft and nontender.  RLE: Dressing clean, dry, and intact. +HMV.  +EHL/FHL/TA/GSC. SILT L3-S1. +Dorsalis pedis pulse, capillary refill brisk. Compartments soft and nontender.    57F s/p B/L TKA POD# 2  Patient one time elevated temp (100.7) and increasing WBC count (15.1) discussed with Dr. Somers, believes it to be a reactive process to surgery, patient is non toxic, no evidence of infectious etiology, normal post op process, stable from medicine perspective for discharge- if concern persists, house physician at can trend [facility] where patient is to be discharged today  WBAT  Pain control  drain output  DVT prophylaxis  PT  Discharge planning- marlene TODAY

## 2018-01-07 PROCEDURE — 99255 IP/OBS CONSLTJ NEW/EST HI 80: CPT

## 2018-01-07 PROCEDURE — 93010 ELECTROCARDIOGRAM REPORT: CPT

## 2018-01-07 PROCEDURE — 99222 1ST HOSP IP/OBS MODERATE 55: CPT | Mod: AI

## 2018-01-08 PROCEDURE — 99232 SBSQ HOSP IP/OBS MODERATE 35: CPT

## 2018-01-09 LAB — SURGICAL PATHOLOGY FINAL REPORT - CH: SIGNIFICANT CHANGE UP

## 2018-01-09 PROCEDURE — 99232 SBSQ HOSP IP/OBS MODERATE 35: CPT

## 2018-01-10 PROCEDURE — 99232 SBSQ HOSP IP/OBS MODERATE 35: CPT

## 2018-01-11 DIAGNOSIS — D72.829 ELEVATED WHITE BLOOD CELL COUNT, UNSPECIFIED: ICD-10-CM

## 2018-01-11 DIAGNOSIS — M17.0 BILATERAL PRIMARY OSTEOARTHRITIS OF KNEE: ICD-10-CM

## 2018-01-11 DIAGNOSIS — Z28.21 IMMUNIZATION NOT CARRIED OUT BECAUSE OF PATIENT REFUSAL: ICD-10-CM

## 2018-01-11 DIAGNOSIS — R11.0 NAUSEA: ICD-10-CM

## 2018-01-11 DIAGNOSIS — Z88.0 ALLERGY STATUS TO PENICILLIN: ICD-10-CM

## 2018-01-11 DIAGNOSIS — R50.9 FEVER, UNSPECIFIED: ICD-10-CM

## 2018-01-11 DIAGNOSIS — D64.9 ANEMIA, UNSPECIFIED: ICD-10-CM

## 2018-01-11 PROCEDURE — 99232 SBSQ HOSP IP/OBS MODERATE 35: CPT

## 2018-01-12 PROCEDURE — 99232 SBSQ HOSP IP/OBS MODERATE 35: CPT

## 2018-01-13 PROCEDURE — 99232 SBSQ HOSP IP/OBS MODERATE 35: CPT | Mod: GC

## 2018-01-14 PROCEDURE — 99232 SBSQ HOSP IP/OBS MODERATE 35: CPT | Mod: GC

## 2018-01-15 PROCEDURE — 93970 EXTREMITY STUDY: CPT | Mod: 26

## 2018-01-15 PROCEDURE — 99232 SBSQ HOSP IP/OBS MODERATE 35: CPT | Mod: GC

## 2018-01-16 PROCEDURE — 99232 SBSQ HOSP IP/OBS MODERATE 35: CPT | Mod: GC

## 2018-01-17 PROCEDURE — 99232 SBSQ HOSP IP/OBS MODERATE 35: CPT

## 2018-01-18 PROCEDURE — 99232 SBSQ HOSP IP/OBS MODERATE 35: CPT | Mod: GC

## 2018-01-19 PROCEDURE — 99232 SBSQ HOSP IP/OBS MODERATE 35: CPT

## 2018-01-20 PROCEDURE — 85025 COMPLETE CBC W/AUTO DIFF WBC: CPT

## 2018-01-20 PROCEDURE — 84100 ASSAY OF PHOSPHORUS: CPT

## 2018-01-20 PROCEDURE — 93970 EXTREMITY STUDY: CPT

## 2018-01-20 PROCEDURE — 83735 ASSAY OF MAGNESIUM: CPT

## 2018-01-20 PROCEDURE — 85027 COMPLETE CBC AUTOMATED: CPT

## 2018-01-20 PROCEDURE — 97110 THERAPEUTIC EXERCISES: CPT

## 2018-01-20 PROCEDURE — 97116 GAIT TRAINING THERAPY: CPT

## 2018-01-20 PROCEDURE — 97167 OT EVAL HIGH COMPLEX 60 MIN: CPT

## 2018-01-20 PROCEDURE — 97163 PT EVAL HIGH COMPLEX 45 MIN: CPT

## 2018-01-20 PROCEDURE — 93005 ELECTROCARDIOGRAM TRACING: CPT

## 2018-01-20 PROCEDURE — 80048 BASIC METABOLIC PNL TOTAL CA: CPT

## 2018-01-20 PROCEDURE — 80053 COMPREHEN METABOLIC PANEL: CPT

## 2018-01-20 PROCEDURE — 97530 THERAPEUTIC ACTIVITIES: CPT

## 2018-01-20 PROCEDURE — 97010 HOT OR COLD PACKS THERAPY: CPT

## 2018-01-20 PROCEDURE — 99239 HOSP IP/OBS DSCHRG MGMT >30: CPT

## 2018-01-20 PROCEDURE — 97535 SELF CARE MNGMENT TRAINING: CPT

## 2018-01-26 ENCOUNTER — APPOINTMENT (OUTPATIENT)
Dept: ORTHOPEDIC SURGERY | Facility: CLINIC | Age: 58
End: 2018-01-26
Payer: OTHER MISCELLANEOUS

## 2018-01-26 DIAGNOSIS — M25.562 PAIN IN RIGHT KNEE: ICD-10-CM

## 2018-01-26 DIAGNOSIS — M25.561 PAIN IN RIGHT KNEE: ICD-10-CM

## 2018-01-26 PROCEDURE — 99024 POSTOP FOLLOW-UP VISIT: CPT

## 2018-01-26 RX ORDER — HYDROMORPHONE HYDROCHLORIDE 2 MG/1
2 TABLET ORAL
Qty: 84 | Refills: 0 | Status: ACTIVE | COMMUNITY
Start: 2018-01-19

## 2018-01-26 RX ORDER — OXYCODONE HYDROCHLORIDE 10 MG/1
10 TABLET, FILM COATED, EXTENDED RELEASE ORAL
Qty: 14 | Refills: 0 | Status: ACTIVE | COMMUNITY
Start: 2018-01-26 | End: 1900-01-01

## 2018-01-26 RX ORDER — HYDROMORPHONE HYDROCHLORIDE 2 MG/1
2 TABLET ORAL
Qty: 60 | Refills: 0 | Status: ACTIVE | COMMUNITY
Start: 2018-01-26 | End: 1900-01-01

## 2018-02-14 ENCOUNTER — APPOINTMENT (OUTPATIENT)
Dept: ORTHOPEDIC SURGERY | Facility: CLINIC | Age: 58
End: 2018-02-14
Payer: OTHER MISCELLANEOUS

## 2018-02-14 DIAGNOSIS — Z47.1 AFTERCARE FOLLOWING JOINT REPLACEMENT SURGERY: ICD-10-CM

## 2018-02-14 DIAGNOSIS — Z96.659 PRESENCE OF UNSPECIFIED ARTIFICIAL KNEE JOINT: ICD-10-CM

## 2018-02-14 PROCEDURE — 99024 POSTOP FOLLOW-UP VISIT: CPT

## 2018-02-14 PROCEDURE — 73562 X-RAY EXAM OF KNEE 3: CPT | Mod: 50

## 2018-03-28 ENCOUNTER — APPOINTMENT (OUTPATIENT)
Dept: ORTHOPEDIC SURGERY | Facility: CLINIC | Age: 58
End: 2018-03-28
Payer: OTHER MISCELLANEOUS

## 2018-03-28 PROCEDURE — 73562 X-RAY EXAM OF KNEE 3: CPT | Mod: 50

## 2018-03-28 PROCEDURE — 99024 POSTOP FOLLOW-UP VISIT: CPT

## 2018-06-20 ENCOUNTER — APPOINTMENT (OUTPATIENT)
Dept: ORTHOPEDIC SURGERY | Facility: CLINIC | Age: 58
End: 2018-06-20
Payer: OTHER MISCELLANEOUS

## 2018-06-20 PROCEDURE — 73562 X-RAY EXAM OF KNEE 3: CPT | Mod: 50

## 2018-06-20 PROCEDURE — 99213 OFFICE O/P EST LOW 20 MIN: CPT

## 2018-08-07 NOTE — CONSULT NOTE ADULT - SUBJECTIVE AND OBJECTIVE BOX
Chief Complaint:  Patient is a 57y old  Female who presents with a chief complaint of bilateral TKA (04 Jan 2018 21:26)      HPI:  Currently no SOB or chest pain or palpitation . + nausea .  Tolerating orally . + Pain . On Lovenox .      Review of Systems:    General:  No wt loss, fevers, chills, night sweats  Eyes:  Good vision, no reported pain  ENT:  No sore throat, pain, runny nose, dysphagia  CV:  No pain, palpitations, hypo/hypertension  Resp:  No dyspnea, cough, tachypnea, wheezing  GI:  No pain, nausea, vomiting, diarrhea .  :  No pain, bleeding, incontinence, nocturia  Muscle:  No pain, weakness  Breast:  No pain, abscess, mass, discharge  Neuro:  No weakness, tingling, memory problems  Psych:  No fatigue, insomnia, mood problems, depression  Endocrine:  No polyuria, polydypsia, cold/heat intolerance  Heme:  No petechiae, ecchymosis, easy bruisability  Skin:  No rash, tattoos, scars, edema    Relevant Family History:  NC  Allergy : PCN     Relevant Social History:  No smoking .    PMH : NC     Physical Exam:      Vital Signs:  Vital Signs Last 24 Hrs  T(C): 36.1 (05 Jan 2018 04:00), Max: 37 (04 Jan 2018 19:54)  T(F): 97 (05 Jan 2018 04:00), Max: 98.6 (04 Jan 2018 19:54)  HR: 80 (05 Jan 2018 10:33) (60 - 85)  BP: 150/79 (05 Jan 2018 10:33) (106/77 - 150/79)  BP(mean): --  RR: 16 (05 Jan 2018 10:33) (13 - 19)  SpO2: 98% (05 Jan 2018 10:33) (95% - 100%)  Daily     Daily   I&O's Summary    04 Jan 2018 07:01  -  05 Jan 2018 07:00  --------------------------------------------------------  IN: 125 mL / OUT: 595 mL / NET: -470 mL        General:  Appears stated age, well-groomed, well-nourished, no distress  HEENT:  NC/AT, patent nares w/ pink mucosa, OP clear w/o lesions, PERRL, EOMI, conjunctivae clear, no thyromegaly, nodules, adenopathy, no JVD  Chest:  Full & symmetric excursion, no increased effort, breath sounds clear  Cardiovascular:  Regular rhythm, S1, S2, no murmur/rub/S3/S4, no carotid/femoral/abdominal bruit, radial/pedal pulses 2+ .  Abdomen:  Soft, non-tender, non-distended, normoactive bowel sounds, no HSM  Extremities: B/L ACE wrap + Pulses . Trace edema .  Skin:  No rash/erythema/ecchymoses/petechiae/wounds/abscess/warm/dry  Musculoskeletal: Intact .  Neuro/Psych:  Alert, oriented, normal and symmetric strength in UEs, LEs .    Laboratory:                            10.7   12.3  )-----------( 280      ( 05 Jan 2018 06:53 )             32.1     01-05    140  |  105  |  12  ----------------------------<  123<H>  4.4   |  26  |  0.93    Ca    8.5      05 Jan 2018 06:53            CAPILLARY BLOOD GLUCOSE          PT/INR - ( 04 Jan 2018 18:04 )   PT: 12.1 sec;   INR: 1.11 ratio               Imaging:      Assessment: S/P B/L Knee replacement . Anemia . ? Surgical .      Plan:  Pain control . PT/OT . Iron . On Lovenox for DVT prophylaxsis . D/W the patient . Modified Advancement Flap Text: The defect edges were debeveled with a #15 scalpel blade.  Given the location of the defect, shape of the defect and the proximity to free margins a modified advancement flap was deemed most appropriate.  Using a sterile surgical marker, an appropriate advancement flap was drawn incorporating the defect and placing the expected incisions within the relaxed skin tension lines where possible.    The area thus outlined was incised deep to adipose tissue with a #15 scalpel blade.  The skin margins were undermined to an appropriate distance in all directions utilizing iris scissors.

## 2018-09-12 ENCOUNTER — APPOINTMENT (OUTPATIENT)
Dept: ORTHOPEDIC SURGERY | Facility: CLINIC | Age: 58
End: 2018-09-12
Payer: OTHER MISCELLANEOUS

## 2018-09-12 PROBLEM — L40.9 PSORIASIS, UNSPECIFIED: Chronic | Status: ACTIVE | Noted: 2017-12-20

## 2018-09-12 PROCEDURE — 73562 X-RAY EXAM OF KNEE 3: CPT | Mod: 50

## 2018-09-12 PROCEDURE — 99213 OFFICE O/P EST LOW 20 MIN: CPT

## 2018-12-14 ENCOUNTER — APPOINTMENT (OUTPATIENT)
Dept: ORTHOPEDIC SURGERY | Facility: CLINIC | Age: 58
End: 2018-12-14
Payer: OTHER MISCELLANEOUS

## 2018-12-14 PROCEDURE — 99213 OFFICE O/P EST LOW 20 MIN: CPT

## 2018-12-14 PROCEDURE — 73562 X-RAY EXAM OF KNEE 3: CPT | Mod: 50

## 2019-04-18 ENCOUNTER — TRANSCRIPTION ENCOUNTER (OUTPATIENT)
Age: 59
End: 2019-04-18

## 2020-01-03 ENCOUNTER — APPOINTMENT (OUTPATIENT)
Dept: ORTHOPEDIC SURGERY | Facility: CLINIC | Age: 60
End: 2020-01-03
Payer: OTHER MISCELLANEOUS

## 2020-01-03 PROCEDURE — 99213 OFFICE O/P EST LOW 20 MIN: CPT

## 2020-01-03 PROCEDURE — 73564 X-RAY EXAM KNEE 4 OR MORE: CPT | Mod: 50

## 2020-01-03 NOTE — PHYSICAL EXAM
[de-identified] : Left knee xrays,  AP, lateral, merchant view,  taken at the office today demonstrates a total knee replacement in satisfactory position and alignment. No evidence of loosening. The patella sits in a central position\par \par Right knee xrays,  AP, lateral, merchant view, taken at the office today demonstrates a total knee replacement in satisfactory position and alignment. No evidence of loosening. The patella sits in a central position  [de-identified] : Left Knee\par Inspection: no effusion\par Wounds: healed midline incision\par Alignment: normal.\par Palpation: no specific tenderness on palpation.\par ROM: Active (in degrees) 0-110\par Ligamentous laxity (neg): negative ant. drawer test, negative post. drawer test, stable to varus stress test, stable to valgus stress test,\par Patellofemoral Alignment Test: Q angle-, normal.\par Muscle Test: good quad strength.\par Leg examination: calf is soft and non-tender. \par \par Right Knee\par Inspection: no effusion\par Wounds: healed midline incision\par Alignment: normal.\par Palpation: no specific tenderness on palpation.\par ROM: Active (in degrees) 0-110\par Ligamentous laxity (neg): negative ant. drawer test, negative post. drawer test, stable to varus stress test, stable to valgus stress test,\par Patellofemoral Alignment Test: Q angle-, normal.\par Muscle Test: good quad strength.\par Leg examination: calf is soft and non-tender.

## 2020-01-03 NOTE — HISTORY OF PRESENT ILLNESS
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 Patient continues to do well and has no complaints. She is able to work and complete activities with no limitations. Here for an annual check.

## 2020-01-03 NOTE — REASON FOR VISIT
[Follow-Up Visit] : a follow-up visit for [Worker's Compensation] : this visit is related to worker's compensation [Artificial Knee Joint] : artificial knee joint

## 2020-01-03 NOTE — ADDENDUM
[FreeTextEntry1] : This note was written by Cintia Howard on 01/03/2020 acting as scribe for Dr. Tristan Sweet M.D.\par \par I, Dr. Tristan Sweet M.D., have read and attest that all the information, medical decision making and discharge instructions within are true and accurate.

## 2020-01-03 NOTE — DISCUSSION/SUMMARY
[de-identified] : Patient is doing well following their bilateral TKR at 2 years following work-related injury. I reviewed x-ray films with them. They will continue activities as tolerated. Patient will follow up with x-rays in 1 year.

## 2020-10-14 NOTE — PATIENT PROFILE ADULT. - MEDICATION ADMINISTRATION INFO, PROFILE
Can you call her and get her in for ELIZABETH KITCHEN from ER
Lore Villatoro" MRN: [id number]    Date: 10/15/2020 Status: [facility]    Time: 1:15 PM Length: 30 [id number]   Visit Type: TRANSITIONAL CARE [[id number]] Copay: $0.00    Provider: Aditya Rivera MD        PSR called patient and scheduled EMILY appointment at PCP's request.
no concerns

## 2021-01-08 ENCOUNTER — APPOINTMENT (OUTPATIENT)
Dept: ORTHOPEDIC SURGERY | Facility: CLINIC | Age: 61
End: 2021-01-08
Payer: OTHER MISCELLANEOUS

## 2021-01-08 PROCEDURE — 99213 OFFICE O/P EST LOW 20 MIN: CPT

## 2021-01-08 PROCEDURE — 73562 X-RAY EXAM OF KNEE 3: CPT | Mod: 50

## 2021-01-08 PROCEDURE — 99072 ADDL SUPL MATRL&STAF TM PHE: CPT

## 2021-09-10 NOTE — PATIENT PROFILE ADULT. - ANESTHESIA, PREVIOUS REACTION, PROFILE
Assumed care of pt at [facility], pt currently in afib w/ controlled rate, cardizem gtt infusing. Pt is up in chair, A&Ox4, on 2L NC, crackles in lower bases. PO fluids and IS encouraged.       Pt complained of some nausea this evening which was relieved with PRN none

## 2022-01-07 ENCOUNTER — APPOINTMENT (OUTPATIENT)
Dept: ORTHOPEDIC SURGERY | Facility: CLINIC | Age: 62
End: 2022-01-07
Payer: OTHER MISCELLANEOUS

## 2022-01-07 PROCEDURE — 99072 ADDL SUPL MATRL&STAF TM PHE: CPT

## 2022-01-07 PROCEDURE — 73562 X-RAY EXAM OF KNEE 3: CPT | Mod: 50

## 2022-01-07 PROCEDURE — 99213 OFFICE O/P EST LOW 20 MIN: CPT

## 2022-01-07 NOTE — HISTORY OF PRESENT ILLNESS
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 Patient is doing well. She denies any new major complaints or issues at this time. Patient has been participating in their usual physical activities without pain or discomfort. Patient denies the use of any pain medications.

## 2022-01-07 NOTE — PHYSICAL EXAM
[de-identified] : General appearance: well nourished and hydrated, pleasant, alert and oriented x 3, cooperative.\par HEENT: Normocephalic, EOM intact, Nasal septum midline, Oral cavity clear, External auditory canal clear.\par Cardiovascular: no apparent abnormalities, no lower leg edema, no varicosities, pedal pulses are palpable.\par Lymphatics Lymph nodes: none palpated, Lymphedema: not present.\par Neurologic: sensation is normal, no muscle weakness in upper or lower extremities, patella tendon reflexes intact .\par Dermatologic no apparent skin lesions, moist, warm, no rash.\par Spine:cervical spine appears normal and moves freely, thoracic spine appears normal and moves freely, lumbosacral spine appears normal and moves freely.\par Gait: nonantalgic. \par \par Left Knee\par Inspection: no effusion\par Wounds: healed midline incision\par Alignment: normal.\par Palpation: no specific tenderness on palpation.\par ROM: Active (in degrees) 0-110\par Ligamentous laxity (neg): negative ant. drawer test, negative post. drawer test, stable to varus stress test, stable to valgus stress test,\par Patellofemoral Alignment Test: Q angle-, normal.\par Muscle Test: good quad strength.\par Leg examination: calf is soft and non-tender. \par \par Right Knee\par Inspection: no effusion\par Wounds: healed midline incision\par Alignment: normal.\par Palpation: no specific tenderness on palpation.\par ROM: Active (in degrees) 0-110\par Ligamentous laxity (neg): negative ant. drawer test, negative post. drawer test, stable to varus stress test, stable to valgus stress test,\par Patellofemoral Alignment Test: Q angle-, normal.\par Muscle Test: good quad strength.\par Leg examination: calf is soft and non-tender.  [de-identified] : Left knee xrays,  AP, lateral, merchant view,  taken at the office today demonstrates a total knee replacement in satisfactory position and alignment. No evidence of loosening. The patella sits in a central position\par \par Right knee xrays,  AP, lateral, merchant view, taken at the office today demonstrates a total knee replacement in satisfactory position and alignment. No evidence of loosening. The patella sits in a central position

## 2022-01-07 NOTE — ADDENDUM
[FreeTextEntry1] : This note was written by Perla Meyer on 01/08/2021 acting as scribe for Dr. Tristan Sweet M.D.\par \par I, Dr. Tristan Sweet, have read and attest that all the information, medical decision making and discharge instructions within are true and accurate.

## 2022-01-07 NOTE — DISCUSSION/SUMMARY
[de-identified] : Patient is doing well following their bilateral TKR at 3 years. I reviewed x-ray films with them. I have reassured her that her implants are functioning well. \par \par She is encouraged to stay active, and continue with her home exercise program.\par \par She can continue activities as tolerated. All questions answered, understanding verbalized. Patient in agreement with plan of care. Patient may follow up with x-rays in 1 year.

## 2022-01-07 NOTE — REASON FOR VISIT
[Follow-Up Visit] : a follow-up visit for [Worker's Compensation] : this visit is related to worker's compensation [Artificial Knee Joint] : artificial knee joint [Knee Pain] : knee pain [Other: ____] : [unfilled] [FreeTextEntry2] : s/p bilateral total knee replacement

## 2022-06-19 NOTE — PATIENT PROFILE ADULT. - PMH
appears normal and intact Psoriasis    Torn meniscus  left knee  Uterine leiomyoma appears normal and intact

## 2022-10-07 NOTE — H&P PST ADULT - PULMONARY EMBOLUS
Ally GILLIS /[facility]: called. Pt was discharged    Spine appears normal, movement of extremities grossly intact. no

## 2022-11-07 NOTE — PRE-OP CHECKLIST - PATIENT SENT AT
Keep appt with gastroenterology and Dr Bateman.     Continue current plan of care as discussed.   Take medication as ordered (if applicable).    Practice good sleep hygiene.  Eat a well balanced diet with fresh fruit and vegetables.    Stay hydrated, drink water daily.      Limit sweetened beverages, sodas, juices.    Bake, boil, broil or grill your food, avoid eating fried foods.   Regular exercise is important.  Incorporate weight or resistance into your routine.    
04-Jan-2018 13:02

## 2023-01-25 ENCOUNTER — APPOINTMENT (OUTPATIENT)
Dept: ORTHOPEDIC SURGERY | Facility: CLINIC | Age: 63
End: 2023-01-25
Payer: OTHER MISCELLANEOUS

## 2023-01-25 VITALS — WEIGHT: 216 LBS | HEIGHT: 64 IN | BODY MASS INDEX: 36.88 KG/M2

## 2023-01-25 PROCEDURE — 99213 OFFICE O/P EST LOW 20 MIN: CPT

## 2023-01-25 PROCEDURE — 99072 ADDL SUPL MATRL&STAF TM PHE: CPT

## 2023-01-25 PROCEDURE — 73562 X-RAY EXAM OF KNEE 3: CPT | Mod: 50

## 2023-01-25 NOTE — ADDENDUM
[FreeTextEntry1] : This note was written by Triny Somers on 01/25/2023 acting as scribe for Dr. Tristan Sweet M.D.\par \par I, Dr. Tristan Sweet, have read and attest that all the information, medical decision making and discharge instructions within are true and accurate.

## 2023-01-25 NOTE — PHYSICAL EXAM
[de-identified] : General appearance: well nourished and hydrated, pleasant, alert and oriented x 3, cooperative.\par HEENT: Normocephalic, EOM intact, Nasal septum midline, Oral cavity clear, External auditory canal clear.\par Cardiovascular: no apparent abnormalities, no lower leg edema, no varicosities, pedal pulses are palpable.\par Lymphatics Lymph nodes: none palpated, Lymphedema: not present.\par Neurologic: sensation is normal, no muscle weakness in upper or lower extremities, patella tendon reflexes intact .\par Dermatologic no apparent skin lesions, moist, warm, no rash.\par Spine:cervical spine appears normal and moves freely, thoracic spine appears normal and moves freely, lumbosacral spine appears normal and moves freely.\par Gait: nonantalgic.\par \par Right Knee\par Inspection: no effusion\par Wounds: healed midline incision\par Alignment: normal.\par Palpation: no specific tenderness on palpation.\par ROM: Active (in degrees): 0-110\par Ligamentous laxity (neg): negative ant. drawer test, negative post. drawer test, stable to varus stress test, stable to valgus stress test,\par Patellofemoral Alignment Test: Q angle-, normal.\par Muscle Test: good quad strength.\par Leg examination: calf is soft and non-tender.\par \par Left Knee\par Inspection: no effusion\par Wounds: healed midline incision\par Alignment: normal.\par Palpation: no specific tenderness on palpation.\par ROM: Active (in degrees): 0-110\par Ligamentous laxity (neg): negative ant. drawer test, negative post. drawer test, stable to varus stress test, stable to valgus stress test,\par Patellofemoral Alignment Test: Q angle-, normal.\par Muscle Test: good quad strength.\par Leg examination: calf is soft and non-tender. [de-identified] : Right knee xray, AP, lateral, merchant view taken at the office today demonstrates a total knee replacement in satisfactory position and alignment. No evidence of loosening. The patella sits in a central position.\par \par Left knee xray, AP, lateral, merchant view taken at the office today demonstrates a total knee replacement in satisfactory position and alignment. No evidence of loosening. The patella sits in a central position.

## 2023-01-25 NOTE — DISCUSSION/SUMMARY
[de-identified] : Pt is doing well s/p bilateral TKR at 5 years, related to a WC case. Patient can continue with a home exercise program and activities as tolerated. All questions answered, understanding verbalized. Patient in agreement with plan of care.\par \par I will see her back in 1 year with xrays of both knees.
s/p Open Laparoscopic Ileostomy Reversal with Lysis of Adhesions/yes

## 2023-01-25 NOTE — HISTORY OF PRESENT ILLNESS
[de-identified] : TEVIN CHACON is a 62 year old female who presents for follow up evaluation s/p bilateral TKR at 6 years. Pt is doing well with no complaints.

## 2023-09-15 ASSESSMENT — KOOS JR
RISING FROM SITTING: MILD
IMPORTED FORM: YES
IMPORTED KOOS JR SCORE: 54.84
STRAIGHTENING KNEE FULLY: MILD
HOW SEVERE IS YOUR KNEE STIFFNESS AFTER FIRST WAKING IN MORNING: MODERATE
TWISING OR PIVOTING ON KNEE: MILD
KOOS JR RAW SCORE: 11
IMPORTED LATERALITY: LEFT
RISING FROM SITTING: MODERATE
KOOS JR RAW SCORE: 11
IMPORTED LATERALITY: LEFT
STANDING UPRIGHT: MILD
STRAIGHTENING KNEE FULLY: MILD
GOING UP OR DOWN STAIRS: MILD
GOING UP OR DOWN STAIRS: SEVERE
STRAIGHTENING KNEE FULLY: MODERATE
IMPORTED KOOS JR SCORE: 59.38
TWISING OR PIVOTING ON KNEE: MILD
TWISING OR PIVOTING ON KNEE: MILD
IMPORTED KOOS JR SCORE: 54.84
KOOS JR RAW SCORE: 13
IMPORTED KOOS JR SCORE: 70.7
IMPORTED LATERALITY: LEFT
IMPORTED LATERALITY: LEFT
GOING UP OR DOWN STAIRS: MODERATE
TWISING OR PIVOTING ON KNEE: MILD
STRAIGHTENING KNEE FULLY: MILD
IMPORTED FORM: YES
BENDING TO THE FLOOR TO PICK UP OBJECT: SEVERE
GOING UP OR DOWN STAIRS: MILD
IMPORTED FORM: YES
HOW SEVERE IS YOUR KNEE STIFFNESS AFTER FIRST WAKING IN MORNING: MILD
GOING UP OR DOWN STAIRS: MODERATE
KOOS JR RAW SCORE: 11
BENDING TO THE FLOOR TO PICK UP OBJECT: MODERATE
BENDING TO THE FLOOR TO PICK UP OBJECT: MODERATE
STANDING UPRIGHT: MILD
GOING UP OR DOWN STAIRS: MODERATE
STRAIGHTENING KNEE FULLY: MILD
STRAIGHTENING KNEE FULLY: MILD
IMPORTED LATERALITY: LEFT
HOW SEVERE IS YOUR KNEE STIFFNESS AFTER FIRST WAKING IN MORNING: SEVERE
BENDING TO THE FLOOR TO PICK UP OBJECT: MODERATE
IMPORTED FORM: YES
BENDING TO THE FLOOR TO PICK UP OBJECT: MODERATE
STRAIGHTENING KNEE FULLY: MODERATE
IMPORTED KOOS JR SCORE: 59.38
IMPORTED FORM: YES
IMPORTED KOOS JR SCORE: 42.28
GOING UP OR DOWN STAIRS: MODERATE
BENDING TO THE FLOOR TO PICK UP OBJECT: SEVERE
STRAIGHTENING KNEE FULLY: MODERATE
STANDING UPRIGHT: MODERATE
TWISING OR PIVOTING ON KNEE: MILD
IMPORTED KOOS JR SCORE: 59.38
KOOS JR RAW SCORE: 13
STANDING UPRIGHT: MILD
IMPORTED LATERALITY: LEFT
IMPORTED KOOS JR SCORE: 42.28
RISING FROM SITTING: MODERATE
TWISING OR PIVOTING ON KNEE: MILD
HOW SEVERE IS YOUR KNEE STIFFNESS AFTER FIRST WAKING IN MORNING: MODERATE
GOING UP OR DOWN STAIRS: MODERATE
TWISING OR PIVOTING ON KNEE: MILD
GOING UP OR DOWN STAIRS: MODERATE
TWISING OR PIVOTING ON KNEE: MODERATE
RISING FROM SITTING: MODERATE
HOW SEVERE IS YOUR KNEE STIFFNESS AFTER FIRST WAKING IN MORNING: MILD
IMPORTED KOOS JR SCORE: 54.84
BENDING TO THE FLOOR TO PICK UP OBJECT: SEVERE
KOOS JR RAW SCORE: 6
GOING UP OR DOWN STAIRS: MODERATE
HOW SEVERE IS YOUR KNEE STIFFNESS AFTER FIRST WAKING IN MORNING: MODERATE
IMPORTED KOOS JR SCORE: 59.38
KOOS JR RAW SCORE: 18
IMPORTED LATERALITY: LEFT
IMPORTED LATERALITY: LEFT
KOOS JR RAW SCORE: 13
HOW SEVERE IS YOUR KNEE STIFFNESS AFTER FIRST WAKING IN MORNING: SEVERE
HOW SEVERE IS YOUR KNEE STIFFNESS AFTER FIRST WAKING IN MORNING: SEVERE
BENDING TO THE FLOOR TO PICK UP OBJECT: MODERATE
STRAIGHTENING KNEE FULLY: MILD
IMPORTED KOOS JR SCORE: 42.28
STANDING UPRIGHT: MODERATE
BENDING TO THE FLOOR TO PICK UP OBJECT: MODERATE
RISING FROM SITTING: SEVERE
TWISING OR PIVOTING ON KNEE: MILD
IMPORTED KOOS JR SCORE: 59.38
KOOS JR RAW SCORE: 11
RISING FROM SITTING: MILD
BENDING TO THE FLOOR TO PICK UP OBJECT: MODERATE
BENDING TO THE FLOOR TO PICK UP OBJECT: MODERATE
GOING UP OR DOWN STAIRS: MILD
HOW SEVERE IS YOUR KNEE STIFFNESS AFTER FIRST WAKING IN MORNING: MODERATE
GOING UP OR DOWN STAIRS: MODERATE
BENDING TO THE FLOOR TO PICK UP OBJECT: SEVERE
HOW SEVERE IS YOUR KNEE STIFFNESS AFTER FIRST WAKING IN MORNING: MODERATE
RISING FROM SITTING: MILD
STANDING UPRIGHT: MODERATE
TWISING OR PIVOTING ON KNEE: MODERATE
KOOS JR RAW SCORE: 6
IMPORTED FORM: YES
IMPORTED KOOS JR SCORE: 59.38
STRAIGHTENING KNEE FULLY: MODERATE
IMPORTED KOOS JR SCORE: 70.7
KOOS JR RAW SCORE: 11
IMPORTED FORM: YES
IMPORTED LATERALITY: LEFT
HOW SEVERE IS YOUR KNEE STIFFNESS AFTER FIRST WAKING IN MORNING: MODERATE
IMPORTED KOOS JR SCORE: 54.84
KOOS JR RAW SCORE: 18
BENDING TO THE FLOOR TO PICK UP OBJECT: MODERATE
STANDING UPRIGHT: MILD
RISING FROM SITTING: MODERATE
STANDING UPRIGHT: MODERATE
STANDING UPRIGHT: MILD
KOOS JR RAW SCORE: 11
IMPORTED KOOS JR SCORE: 70.7
HOW SEVERE IS YOUR KNEE STIFFNESS AFTER FIRST WAKING IN MORNING: MODERATE
TWISING OR PIVOTING ON KNEE: MODERATE
GOING UP OR DOWN STAIRS: MODERATE
BENDING TO THE FLOOR TO PICK UP OBJECT: MODERATE
KOOS JR RAW SCORE: 6
RISING FROM SITTING: MILD
STRAIGHTENING KNEE FULLY: MILD
STANDING UPRIGHT: MILD
STRAIGHTENING KNEE FULLY: MILD
GOING UP OR DOWN STAIRS: SEVERE
IMPORTED FORM: YES
HOW SEVERE IS YOUR KNEE STIFFNESS AFTER FIRST WAKING IN MORNING: MODERATE
KOOS JR RAW SCORE: 18
KOOS JR RAW SCORE: 13
STANDING UPRIGHT: MODERATE
TWISING OR PIVOTING ON KNEE: MODERATE
RISING FROM SITTING: SEVERE
IMPORTED FORM: YES
STANDING UPRIGHT: MILD
HOW SEVERE IS YOUR KNEE STIFFNESS AFTER FIRST WAKING IN MORNING: MODERATE
IMPORTED KOOS JR SCORE: 70.7
IMPORTED LATERALITY: LEFT
TWISING OR PIVOTING ON KNEE: MODERATE
HOW SEVERE IS YOUR KNEE STIFFNESS AFTER FIRST WAKING IN MORNING: SEVERE
STANDING UPRIGHT: MODERATE
IMPORTED KOOS JR SCORE: 42.28
RISING FROM SITTING: SEVERE
RISING FROM SITTING: MODERATE
STANDING UPRIGHT: MODERATE
GOING UP OR DOWN STAIRS: SEVERE
TWISING OR PIVOTING ON KNEE: MODERATE
GOING UP OR DOWN STAIRS: SEVERE
STRAIGHTENING KNEE FULLY: MILD
TWISING OR PIVOTING ON KNEE: MILD
RISING FROM SITTING: SEVERE
KOOS JR RAW SCORE: 18
RISING FROM SITTING: MILD
GOING UP OR DOWN STAIRS: MILD
KOOS JR RAW SCORE: 11
IMPORTED KOOS JR SCORE: 59.38
RISING FROM SITTING: MILD
TWISING OR PIVOTING ON KNEE: MODERATE
KOOS JR RAW SCORE: 6

## 2024-01-08 ENCOUNTER — APPOINTMENT (OUTPATIENT)
Dept: ORTHOPEDIC SURGERY | Facility: CLINIC | Age: 64
End: 2024-01-08
Payer: OTHER MISCELLANEOUS

## 2024-01-08 VITALS — HEIGHT: 64 IN | WEIGHT: 223 LBS | BODY MASS INDEX: 38.07 KG/M2

## 2024-01-08 DIAGNOSIS — Z96.653 PRESENCE OF ARTIFICIAL KNEE JOINT, BILATERAL: ICD-10-CM

## 2024-01-08 DIAGNOSIS — M17.2 BILATERAL POST-TRAUMATIC OSTEOARTHRITIS OF KNEE: ICD-10-CM

## 2024-01-08 PROCEDURE — 73562 X-RAY EXAM OF KNEE 3: CPT | Mod: 50

## 2024-01-08 PROCEDURE — 99213 OFFICE O/P EST LOW 20 MIN: CPT

## 2024-01-08 NOTE — DISCUSSION/SUMMARY
[de-identified] : Patient is doing well following their s/p Bilateral TKR. I reviewed x-rays with them and compared to prior films. I have reassured them that their implants are functioning well. All questions answered, understanding verbalized.    She is encouraged to continue to stay active with a home exercise program and activities as tolerated.    I will see them back in 1 year.

## 2024-01-08 NOTE — HISTORY OF PRESENT ILLNESS
[de-identified] : TEVIN CHACON 63 year old female presents for follow-up evaluation s/p B/L TKRs at 7 years. She is doing well. She continues to stay active with home exercises. Her only complaint is tightness on the left side when she uses an abb roller.  PAIN

## 2024-01-08 NOTE — PHYSICAL EXAM

## 2024-01-08 NOTE — ADDENDUM
[FreeTextEntry1] : This note was written by Paola Lockhart on 01/08/2024 acting as scribe for Dr. Tristan CERVANTES I, Dr. Tristan Sweet, have read and attest that all the information, medical decision making and discharge instructions within are true and accurate.   This note was written by Garfield Richey on 01/08/2024 acting as scribe for Dr. Tristan CERVANTES I, Dr. Tristan Sweet, have read and attest that all the information, medical decision making and discharge instructions within are true and accurate.

## 2024-01-17 NOTE — OCCUPATIONAL THERAPY INITIAL EVALUATION ADULT - PERTINENT HX OF CURRENT PROBLEM, REHAB EVAL
Bilateral knee OA which impacts patients daily tasks and functional mobility/transfers.
Nicola Sosa(Attending)

## 2025-02-07 ENCOUNTER — APPOINTMENT (OUTPATIENT)
Dept: ORTHOPEDIC SURGERY | Facility: CLINIC | Age: 65
End: 2025-02-07
Payer: OTHER MISCELLANEOUS

## 2025-02-07 VITALS — BODY MASS INDEX: 37.73 KG/M2 | HEIGHT: 64 IN | WEIGHT: 221 LBS

## 2025-02-07 DIAGNOSIS — Z96.653 PRESENCE OF ARTIFICIAL KNEE JOINT, BILATERAL: ICD-10-CM

## 2025-02-07 DIAGNOSIS — M17.2 BILATERAL POST-TRAUMATIC OSTEOARTHRITIS OF KNEE: ICD-10-CM

## 2025-02-07 PROCEDURE — 73562 X-RAY EXAM OF KNEE 3: CPT | Mod: 50

## 2025-02-07 PROCEDURE — 99213 OFFICE O/P EST LOW 20 MIN: CPT

## 2025-04-27 ENCOUNTER — EMERGENCY (EMERGENCY)
Facility: HOSPITAL | Age: 65
LOS: 1 days | End: 2025-04-27
Attending: STUDENT IN AN ORGANIZED HEALTH CARE EDUCATION/TRAINING PROGRAM | Admitting: STUDENT IN AN ORGANIZED HEALTH CARE EDUCATION/TRAINING PROGRAM
Payer: COMMERCIAL

## 2025-04-27 VITALS
HEIGHT: 64 IN | DIASTOLIC BLOOD PRESSURE: 68 MMHG | OXYGEN SATURATION: 98 % | RESPIRATION RATE: 18 BRPM | SYSTOLIC BLOOD PRESSURE: 153 MMHG | HEART RATE: 85 BPM | WEIGHT: 212.97 LBS | TEMPERATURE: 98 F

## 2025-04-27 DIAGNOSIS — Z98.890 OTHER SPECIFIED POSTPROCEDURAL STATES: Chronic | ICD-10-CM

## 2025-04-27 PROCEDURE — 99284 EMERGENCY DEPT VISIT MOD MDM: CPT

## 2025-04-27 NOTE — CONSULT NOTE ADULT - SUBJECTIVE AND OBJECTIVE BOX
[facility] DEPARTMENT OF OPHTHALMOLOGY - INITIAL ADULT CONSULT  -----------------------------------------------------------------------------  Reno Meyer MD, PGY-3  Contact: TEAMS  -----------------------------------------------------------------------------    HPI:    64-year-old female with a past medical history of hypertension presents to the emergency department complaining of right eye vision change.  Patient states she has been seeing large floaters intermittently out of her right eye with squiggly lines in different patterns.  Patient denies eye pain, headache, weakness, dizziness, numbness, tingling, trauma, discharge.    Interval History: ophthalmology consulted for hx as above    PMH: as above  POcHx: denies surg/laser  FH: denies glc/amd  Social History: denies etoh/tobacco  Ophthalmic Medications: none  Allergies: NKDA    Review of Systems:  Constitutional: No fever, chills  Eyes: right eye floater  Neuro: No tremors  Cardiovascular: No chest pain, palpitations  Respiratory: No SOB, no cough  GI: No nausea, vomiting, abdominal pain  : No dysuria  Skin: no rash  Psych: no depression  Endocrine: no polyuria, polydipsia  Heme/lymph: no swelling    VITALS: T(C): 36.7 (04-27-25 @ 12:20)  T(F): 98 (04-27-25 @ 12:20), Max: 98 (04-27-25 @ 12:20)  HR: 85 (04-27-25 @ 12:20) (85 - 85)  BP: 153/68 (04-27-25 @ 12:20) (153/68 - 153/68)  RR:  (18 - 18)  SpO2:  (98% - 98%)  Wt(kg): --  General: AAO x 3, appropriate mood and affect    Ophthalmology Exam:  Visual acuity (sc): 20/30 ph 20/20 OU   Pupils: PERRL OU, no APD  Ttono: 16 OU  Extraocular movements (EOMs): Full OU, no pain, no diplopia  Confrontational Visual Field (CVF): Full OU  Color Plates: 12/12 OU    Pen Light Exam (PLE)  External: Flat OU  Lids/Lashes/Lacrimal Ducts: Flat OU    Sclera/Conjunctiva: W+Q OU  Cornea: Cl OU  Anterior Chamber: D+F OU    Iris: Flat OU  Lens: Cl OU    Fundus Exam: dilated with 1% tropicamide and 2.5% phenylephrine  Approval obtained from primary team for dilation  Patient aware that pupils can remained dilated for at least 4-6 hours  Exam performed with 20D lens    Vitreous: PVD OD, wnl OS   Disc, cup/disc: sharp and pink, 0.4 OU  Macula: wnl OU  Vessels: wnl OU  Periphery: wnl OU    Labs/Imaging:  ***

## 2025-04-27 NOTE — ED PROVIDER NOTE - PATIENT PORTAL LINK FT
You can access the FollowMyHealth Patient Portal offered by [facility] by registering at the following website: [email or web address]. By joining [employer]’s FollowMyHealth portal, you will also be able to view your health information using other applications (apps) compatible with our system.

## 2025-04-27 NOTE — ED PROVIDER NOTE - PROGRESS NOTE DETAILS
MYLA Pritchett: Patient was seen and evaluated by ophthalmology diagnosed with posterior vitreous detachment advised follow-up outpatient in ophthalmology clinic.  Patient feels well, discharge reviewed and discussed with patient.

## 2025-04-27 NOTE — ED PROVIDER NOTE - NSICDXPASTMEDICALHX_GEN_ALL_CORE_FT
PAST MEDICAL HISTORY:  HTN (hypertension)     Psoriasis     Torn meniscus left knee    Uterine leiomyoma

## 2025-04-27 NOTE — ED PROVIDER NOTE - ATTENDING APP SHARED VISIT CONTRIBUTION OF CARE
69-year-old female past medical history of hypertension presents emergency department with right eye vision changes.  See above for physical exam.  Patient's visual fields were intact in all 4 quadrants.  Patient was neurologically intact and ambulatory without ataxia.  Ophthalmology was consulted.  Dilated eye exam was performed which showed vitreous detachment.  Patient was stable for discharge with outpatient follow-up.

## 2025-04-27 NOTE — ED PROVIDER NOTE - NSFOLLOWUPINSTRUCTIONS_ED_ALL_ED_FT
Follow up with your Primary Medical Doctor in 1-2 days.  Follow up with [facility] Department of Ophthalmology in 1-2 days call to schedule an appointment.   [address]  [place] [address]  [phone]  Rest.  Stay well hydrated.  Return to the ER for any persistent/worsening or new symptoms change in vision, weakness, dizziness, pain or any concerning symptoms.

## 2025-04-27 NOTE — CONSULT NOTE ADULT - ASSESSMENT
65 yo female presenting with PVD OD.     Right eye posterior vitreous detachment   - with new onset floaters, flashing lights OD   - vitreous with PVD OD  - careful fundoscopic exam without any tears, holes or detachment   - RD precautions given to patient   - can fu with us address below in 1 week     Outpatient follow-up: Patient should follow-up with his/her ophthalmologist or with [facility] Department of Ophthalmology upon discharge at the address below     [facility] Department of Ophthalmology  [address]  [place] [address]  [phone]

## 2025-04-27 NOTE — ED PROVIDER NOTE - NSICDXPASTSURGICALHX_GEN_ALL_CORE_FT
PAST SURGICAL HISTORY:  S/P bunionectomy Right ( 2016 )    S/P foot surgery, left 2016    S/P laparoscopic surgery LEFT KNEE 2005    S/P myomectomy 2013

## 2025-04-27 NOTE — ED PROVIDER NOTE - CLINICAL SUMMARY MEDICAL DECISION MAKING FREE TEXT BOX
64-year-old female with a past medical history of hypertension presents to the emergency department complaining of right eye vision change.  Patient states she has been seeing large floaters intermittently out of her right eye with squiggly lines in different patterns.  Pt is well appearing, NAD, will obtain Optho consult, reassess.

## 2025-04-27 NOTE — ED PROVIDER NOTE - OBJECTIVE STATEMENT
64-year-old female with a past medical history of hypertension presents to the emergency department complaining of right eye vision change.  Patient states she has been seeing large floaters intermittently out of her right eye with squiggly lines in different patterns.  Patient denies eye pain, headache, weakness, dizziness, numbness, tingling, trauma, discharge.

## 2025-04-28 NOTE — ED POST DISCHARGE NOTE - REASON FOR FOLLOW-UP
Patient called asking for name of Optho resident who saw her in ED BC when she made an optho appt she was asked who saw her in ED. Name given to patient. Other

## 2025-05-02 ENCOUNTER — APPOINTMENT (OUTPATIENT)
Age: 65
End: 2025-05-02
Payer: COMMERCIAL

## 2025-05-02 ENCOUNTER — NON-APPOINTMENT (OUTPATIENT)
Age: 65
End: 2025-05-02

## 2025-05-02 PROBLEM — I10 ESSENTIAL (PRIMARY) HYPERTENSION: Chronic | Status: ACTIVE | Noted: 2025-04-27

## 2025-05-02 PROCEDURE — 92004 COMPRE OPH EXAM NEW PT 1/>: CPT
